# Patient Record
Sex: MALE | Race: WHITE | NOT HISPANIC OR LATINO | Employment: FULL TIME | URBAN - METROPOLITAN AREA
[De-identification: names, ages, dates, MRNs, and addresses within clinical notes are randomized per-mention and may not be internally consistent; named-entity substitution may affect disease eponyms.]

---
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
## 2021-01-14 ENCOUNTER — NON-PROVIDER VISIT (OUTPATIENT)
Dept: URGENT CARE | Facility: PHYSICIAN GROUP | Age: 21
End: 2021-01-14

## 2021-01-14 DIAGNOSIS — Z02.1 PRE-EMPLOYMENT DRUG SCREENING: ICD-10-CM

## 2021-01-14 LAB
AMP AMPHETAMINE: NORMAL
COC COCAINE: NORMAL
INT CON NEG: NORMAL
INT CON POS: NORMAL
MET METHAMPHETAMINES: NORMAL
OPI OPIATES: NORMAL
PCP PHENCYCLIDINE: NORMAL
POC DRUG COMMENT 753798-OCCUPATIONAL HEALTH: NORMAL
THC: NORMAL

## 2021-01-14 PROCEDURE — 80305 DRUG TEST PRSMV DIR OPT OBS: CPT | Performed by: PHYSICIAN ASSISTANT

## 2021-02-18 ENCOUNTER — OFFICE VISIT (OUTPATIENT)
Dept: URGENT CARE | Facility: PHYSICIAN GROUP | Age: 21
End: 2021-02-18
Payer: MEDICAID

## 2021-02-18 VITALS
WEIGHT: 230 LBS | OXYGEN SATURATION: 97 % | HEART RATE: 88 BPM | TEMPERATURE: 98.6 F | RESPIRATION RATE: 16 BRPM | HEIGHT: 70 IN | BODY MASS INDEX: 32.93 KG/M2 | DIASTOLIC BLOOD PRESSURE: 82 MMHG | SYSTOLIC BLOOD PRESSURE: 118 MMHG

## 2021-02-18 DIAGNOSIS — R19.7 NAUSEA VOMITING AND DIARRHEA: ICD-10-CM

## 2021-02-18 DIAGNOSIS — R11.2 NAUSEA VOMITING AND DIARRHEA: ICD-10-CM

## 2021-02-18 PROCEDURE — 99204 OFFICE O/P NEW MOD 45 MIN: CPT | Performed by: PHYSICIAN ASSISTANT

## 2021-02-18 RX ORDER — ONDANSETRON 4 MG/1
4 TABLET, FILM COATED ORAL EVERY 8 HOURS PRN
Qty: 20 TABLET | Refills: 0 | Status: SHIPPED | OUTPATIENT
Start: 2021-02-18 | End: 2021-05-18

## 2021-02-18 ASSESSMENT — FIBROSIS 4 INDEX: FIB4 SCORE: 0.27

## 2021-02-18 NOTE — PROGRESS NOTES
Chief Complaint   Patient presents with   • Emesis       HISTORY OF PRESENT ILLNESS: Patient is a 20 y.o. male who presents today because he has not been feeling good over the last 2 or 3 days, just describes as not feeling good.  However last night he started to have emesis that went into this morning.  He is also had some mild diarrhea.  Denies any fevers, chills.  He has not been taking any medications for his current symptoms    Patient Active Problem List    Diagnosis Date Noted   • Discharge planning issues 07/27/2019   • Intracranial hemorrhage following injury ([facility]) 07/20/2019   • Acute respiratory insufficiency 07/23/2019   • No contraindication to deep vein thrombosis (DVT) prophylaxis 07/21/2019   • Acute blood loss anemia 07/21/2019   • Pelvic fracture ([facility]) 07/20/2019   • Trauma 07/20/2019   • Femoral fracture ([facility]) 07/20/2019   • Lumbar transverse process fracture ([facility]) 07/20/2019   • Closed fracture of right fibula 07/20/2019   • Laceration of right foot 07/20/2019   • Scalp laceration 07/20/2019   • Cigar smoker 10/11/2019       Allergies:Penicillins    Current Outpatient Medications Ordered in Epic   Medication Sig Dispense Refill   • ondansetron (ZOFRAN) 4 MG Tab tablet Take 1 tablet by mouth every 8 hours as needed for Nausea/Vomiting. 20 tablet 0   • docusate sodium (COLACE) 100 MG Cap Take 1 Cap by mouth 2 times a day. 60 Cap 0     No current Epic-ordered facility-administered medications on file.       History reviewed. No pertinent past medical history.    Social History     Tobacco Use   • Smoking status: Current Some Day Smoker     Types: Cigars   • Smokeless tobacco: Never Used   • Tobacco comment: Smokes a cigar pt states every now and then.   Substance Use Topics   • Alcohol use: No   • Drug use: No       No family status information on file.   History reviewed. No pertinent family history.    ROS:  Review of Systems   Constitutional: Negative for fever, chills, positive for myalgias and  "malaise/fatigue.   HENT: Negative for ear pain, nosebleeds, congestion, sore throat and neck pain.    Eyes: Negative for blurred vision.   Respiratory: Negative for cough, sputum production, shortness of breath and wheezing.    Cardiovascular: Negative for chest pain, palpitations, orthopnea and leg swelling.   Gastrointestinal: Negative for heartburn, positive for nausea, vomiting and generalized abdominal pain.   Genitourinary: Negative for dysuria, urgency and frequency.     Exam:  /82 (BP Location: Right arm, Patient Position: Sitting, BP Cuff Size: Large adult)   Pulse 88   Temp 37 °C (98.6 °F) (Temporal)   Resp 16   Ht 1.778 m (5' 10\")   Wt 104 kg (230 lb)   SpO2 97%   General:  Well nourished, well developed male in NAD  Head:Normocephalic, atraumatic  Eyes: PERRLA, EOM within normal limits, no conjunctival injection, no scleral icterus, visual fields and acuity grossly intact.  Nose: Symmetrical without tenderness, no discharge.  Mouth: reasonable hygiene, no erythema exudates or tonsillar enlargement.  Neck: no masses, range of motion within normal limits, no tracheal deviation. No obvious thyroid enlargement.  Abdomen: Nondistended, bowel tones in all 4 quadrants, soft, various areas of mild tenderness to palpation, no organomegaly, no rebound or vertebral tenderness, no Boles's or McBurney's point tenderness.    Extremities: no clubbing, cyanosis, or edema.    Please note that this dictation was created using voice recognition software. I have made every reasonable attempt to correct obvious errors, but I expect that there are errors of grammar and possibly content that I did not discover before finalizing the note.    Assessment/Plan:  1. Nausea vomiting and diarrhea  ondansetron (ZOFRAN) 4 MG Tab tablet   Discussed bland diet, small sips of water.    Followup with primary care in the next 7-10 days if not significantly improving, return to the urgent care or go to the emergency room sooner " for any worsening of symptoms.

## 2021-02-18 NOTE — LETTER
February 18, 2021         Patient: Williams Armstrong   YOB: 2000   Date of Visit: 2/18/2021           To Whom it May Concern:    Williams Armstrong was seen in my clinic on 2/18/2021. He may return to work on 2/20/2021, please excuse any recent absences..    If you have any questions or concerns, please don't hesitate to call.        Sincerely,           Jaswant Schwab P.A.-C.  Electronically Signed

## 2021-05-18 ENCOUNTER — HOSPITAL ENCOUNTER (EMERGENCY)
Facility: MEDICAL CENTER | Age: 21
End: 2021-05-18
Attending: EMERGENCY MEDICINE
Payer: MEDICAID

## 2021-05-18 ENCOUNTER — APPOINTMENT (OUTPATIENT)
Dept: RADIOLOGY | Facility: MEDICAL CENTER | Age: 21
End: 2021-05-18
Attending: EMERGENCY MEDICINE
Payer: MEDICAID

## 2021-05-18 VITALS
BODY MASS INDEX: 31.92 KG/M2 | SYSTOLIC BLOOD PRESSURE: 128 MMHG | RESPIRATION RATE: 16 BRPM | TEMPERATURE: 98 F | OXYGEN SATURATION: 98 % | WEIGHT: 222.44 LBS | HEART RATE: 85 BPM | DIASTOLIC BLOOD PRESSURE: 78 MMHG

## 2021-05-18 DIAGNOSIS — S67.21XA CRUSHING INJURY OF RIGHT HAND, INITIAL ENCOUNTER: ICD-10-CM

## 2021-05-18 DIAGNOSIS — S69.91XA INJURY OF RIGHT HAND, INITIAL ENCOUNTER: ICD-10-CM

## 2021-05-18 PROCEDURE — 700102 HCHG RX REV CODE 250 W/ 637 OVERRIDE(OP): Performed by: EMERGENCY MEDICINE

## 2021-05-18 PROCEDURE — A9270 NON-COVERED ITEM OR SERVICE: HCPCS | Performed by: EMERGENCY MEDICINE

## 2021-05-18 PROCEDURE — 73130 X-RAY EXAM OF HAND: CPT | Mod: RT

## 2021-05-18 PROCEDURE — 99283 EMERGENCY DEPT VISIT LOW MDM: CPT

## 2021-05-18 RX ORDER — IBUPROFEN 600 MG/1
600 TABLET ORAL ONCE
Status: COMPLETED | OUTPATIENT
Start: 2021-05-18 | End: 2021-05-18

## 2021-05-18 RX ORDER — ACETAMINOPHEN 325 MG/1
650 TABLET ORAL ONCE
Status: COMPLETED | OUTPATIENT
Start: 2021-05-18 | End: 2021-05-18

## 2021-05-18 RX ADMIN — ACETAMINOPHEN 650 MG: 325 TABLET, FILM COATED ORAL at 14:27

## 2021-05-18 RX ADMIN — IBUPROFEN 600 MG: 600 TABLET, FILM COATED ORAL at 14:27

## 2021-05-18 ASSESSMENT — FIBROSIS 4 INDEX: FIB4 SCORE: 0.27

## 2021-05-18 ASSESSMENT — PAIN DESCRIPTION - PAIN TYPE: TYPE: ACUTE PAIN

## 2021-05-18 NOTE — ED NOTES
Received orders for DC. Educated regarding f/u with PCP and Dr walker as needed. All questions addressed. VSS. Ambulatory to lobby with steady gait.

## 2021-05-18 NOTE — ED TRIAGE NOTES
Pt comes in complaining of R hand pain since Sunday. Pt stating a transmission fell on his hand when he was working on a truck. Pt with swelling noted

## 2021-05-18 NOTE — ED PROVIDER NOTES
ED Provider Note    This patient was cared for during the COVID-19 pandemic. History and physical exam may be limited/truncated by the inherent challenges of PPE and the need to decrease staff exposure to novel coronavirus. Some aspects of disease management may be different to protect staff and help slow the spread of disease. I verified that, if possible, the patient was wearing a mask and I was wearing appropriate PPE every time I encountered the patient.       CHIEF COMPLAINT  Chief Complaint   Patient presents with   • Hand Pain       HPI  Williams Armstrong is a 20 y.o. male who presents for evaluation of a right hand injury.  Patient says he had an engine fell on his hand on Sunday.  He was seen at [facility] and x-rays were negative however today he was wrenching and he slipped and hit his hand again and has increased pain now to the right hand presents for evaluation.  He has limited range of motion of his finger secondary to the pain.      REVIEW OF SYSTEMS  Positive for right hand pain, negative for other injuries.     PAST MEDICAL HISTORY       SOCIAL HISTORY  Social History     Tobacco Use   • Smoking status: Current Some Day Smoker     Types: Cigars, Cigarettes   • Smokeless tobacco: Never Used   • Tobacco comment: Smokes a cigar pt states every now and then.   Vaping Use   • Vaping Use: Never used   Substance and Sexual Activity   • Alcohol use: Yes   • Drug use: No   • Sexual activity: Not on file       SURGICAL HISTORY   has a past surgical history that includes femur nailing intramedullary (Right, 7/20/2019); irrigation & debridement ortho (Right, 7/20/2019); laceration repair (N/A, 7/20/2019); pelvis orif (Right, 7/24/2019); hardware removal ortho (Right, 10/11/2019); hardware removal ortho (Right, 1/3/2020); and femur orif (Right, 1/3/2020).    CURRENT MEDICATIONS  Home Medications     Reviewed by Stacy Geronimo R.N. (Registered Nurse) on 05/18/21 at 1354  Med List Status: Complete   Medication  Last Dose Status        Patient Jonathan Taking any Medications                       ALLERGIES  Allergies   Allergen Reactions   • Penicillins Rash and Swelling     Rash & Swelling         PHYSICAL EXAM  VITAL SIGNS: /78   Pulse 85   Temp 36.7 °C (98 °F) (Temporal)   Resp 16   Wt 101 kg (222 lb 7.1 oz)   SpO2 98%   BMI 31.92 kg/m²   Constitutional: Alert in no apparent distress. Well apearing  HENT: Normocephalic, Atraumatic, Bilateral external ears normal. Nose normal.   Eyes:  Conjunctiva normal, non-icteric.   Lungs: Non-labored respirations  Skin: Warm, Dry, No erythema, No rash.   Neurologic: Alert, Grossly non-focal.   Psychiatric: Affect normal, Judgment normal, Mood normal, Appears appropriate and not intoxicated.   Extremities: Right hand with swelling over the fourth and fifth metacarpals and limited range of motion at the MCP joints      DIAGNOSTIC STUDIES / PROCEDURES  DX-HAND 3+ RIGHT   Final Result      No acute osseous abnormality.            COURSE & MEDICAL DECISION MAKING  Pertinent Labs & Imaging studies reviewed. (See chart for details)  This is a 20-year-old that presents with a recurrent right hand injury. X-ray is performed and there does not appear to be any fracture. I suspect this is more soft tissue injury. He has good perfusion distally. I did recommend rest ice and elevation and he can follow-up with orthopedics as an outpatient if not improving.     The patient will return for new or worsening symptoms and is stable at the time of discharge. Patient was given return precautions. Patient and/or family member verbalizes understanding and will comply.    DISPOSITION:  Patient will be discharged home in stable condition.    FOLLOW UP:  Delio [facility]oussaint M.D.  [address] [facility] [id number] [phone]      As needed    Davonte Franks M.D.  [address] [place] [address]  [phone]    Schedule an appointment as soon as possible for a visit   As needed ortho follow  [facility] [place], Emergency Dept  [address] [facility] [address]  [phone]    Return for worsening hand swelling pain or other concerns        OUTPATIENT MEDICATIONS:  There are no discharge medications for this patient.        FINAL IMPRESSION  1. Injury of right hand, initial encounter     2. Crushing injury of right hand, initial encounter              This dictation has been creating using voice recognition software. The accuracy of the dictation is limited the abilities of the software.  I expect there may be some errors of grammar and possibly content. I made every attempt to manually correct the errors within my dictation. However errors related to this voice recognition software may still exist and should be interpreted within the appropriate context.        The note accurately reflects work and decisions made by me.  Ally Yoon M.D.  5/18/2021  4:08 PM

## 2021-08-26 ENCOUNTER — OFFICE VISIT (OUTPATIENT)
Dept: URGENT CARE | Facility: CLINIC | Age: 21
End: 2021-08-26

## 2021-08-26 VITALS
HEIGHT: 68 IN | TEMPERATURE: 97.8 F | OXYGEN SATURATION: 97 % | BODY MASS INDEX: 36.22 KG/M2 | DIASTOLIC BLOOD PRESSURE: 80 MMHG | HEART RATE: 74 BPM | SYSTOLIC BLOOD PRESSURE: 116 MMHG | WEIGHT: 239 LBS | RESPIRATION RATE: 14 BRPM

## 2021-08-26 DIAGNOSIS — Z02.1 PRE-EMPLOYMENT DRUG SCREENING: ICD-10-CM

## 2021-08-26 DIAGNOSIS — Z02.1 PHYSICAL EXAM, PRE-EMPLOYMENT: ICD-10-CM

## 2021-08-26 PROCEDURE — 80305 DRUG TEST PRSMV DIR OPT OBS: CPT | Performed by: PHYSICIAN ASSISTANT

## 2021-08-26 PROCEDURE — 8915 PR COMPREHENSIVE PHYSICAL: Performed by: PHYSICIAN ASSISTANT

## 2021-08-26 ASSESSMENT — ENCOUNTER SYMPTOMS
SHORTNESS OF BREATH: 0
FEVER: 0
DIARRHEA: 0
CHILLS: 0
VOMITING: 0
HEADACHES: 0
SORE THROAT: 0
MUSCULOSKELETAL NEGATIVE: 1
DIZZINESS: 0
NAUSEA: 0
PSYCHIATRIC NEGATIVE: 1

## 2021-08-26 ASSESSMENT — FIBROSIS 4 INDEX: FIB4 SCORE: 0.28

## 2021-08-26 NOTE — PROGRESS NOTES
"Subjective     Williams Armstrong is a 21 y.o. male who presents with Employment Physical    Pt PMH, SocHx, SurgHx, FamHx, Drug allergies and medications reviewed with pt/EPIC.      Family history reviewed, it is not pertinent to this complaint.           Patient presents with:  Employment Physical          Review of Systems   Constitutional: Negative for chills and fever.   HENT: Negative for congestion and sore throat.    Respiratory: Negative for shortness of breath.    Cardiovascular: Negative for chest pain.   Gastrointestinal: Negative for diarrhea, nausea and vomiting.   Genitourinary: Negative.    Musculoskeletal: Negative.    Skin: Negative for rash.   Neurological: Negative for dizziness and headaches.   Psychiatric/Behavioral: Negative.    All other systems reviewed and are negative.         See scanned documents for exam results.      Objective     /80   Pulse 74   Temp 36.6 °C (97.8 °F) (Temporal)   Resp 14   Ht 1.727 m (5' 8\")   Wt 108 kg (239 lb)   SpO2 97%   BMI 36.34 kg/m²      Physical Exam          See scanned documents for exam results.           Assessment & Plan        1. Physical exam, pre-employment     Pt is cleared without restrictions.    Patient was evaluated in clinic today while wearing appropriate personal protective equipment.                  "

## 2022-01-04 ENCOUNTER — NON-PROVIDER VISIT (OUTPATIENT)
Dept: URGENT CARE | Facility: PHYSICIAN GROUP | Age: 22
End: 2022-01-04
Payer: MEDICAID

## 2022-01-04 DIAGNOSIS — Z02.1 PRE-EMPLOYMENT DRUG SCREENING: ICD-10-CM

## 2022-01-04 PROCEDURE — 80305 DRUG TEST PRSMV DIR OPT OBS: CPT | Performed by: FAMILY MEDICINE

## 2022-01-20 ENCOUNTER — HOSPITAL ENCOUNTER (OUTPATIENT)
Dept: RADIOLOGY | Facility: MEDICAL CENTER | Age: 22
End: 2022-01-20
Attending: PHYSICAL MEDICINE & REHABILITATION
Payer: MEDICAID

## 2022-01-20 DIAGNOSIS — M54.59 MECHANICAL LOW BACK PAIN: ICD-10-CM

## 2022-01-20 PROCEDURE — 72110 X-RAY EXAM L-2 SPINE 4/>VWS: CPT

## 2022-03-08 ENCOUNTER — NON-PROVIDER VISIT (OUTPATIENT)
Dept: URGENT CARE | Facility: PHYSICIAN GROUP | Age: 22
End: 2022-03-08

## 2022-03-08 DIAGNOSIS — Z02.1 PRE-EMPLOYMENT DRUG SCREENING: ICD-10-CM

## 2022-03-08 PROCEDURE — 8907 PR URINE COLLECT ONLY: Performed by: NURSE PRACTITIONER

## 2022-08-22 ENCOUNTER — NON-PROVIDER VISIT (OUTPATIENT)
Dept: URGENT CARE | Facility: PHYSICIAN GROUP | Age: 22
End: 2022-08-22

## 2022-08-22 DIAGNOSIS — Z02.1 PRE-EMPLOYMENT DRUG SCREENING: ICD-10-CM

## 2022-08-22 LAB
AMP AMPHETAMINE: NORMAL
COC COCAINE: NORMAL
INT CON NEG: NEGATIVE
INT CON POS: POSITIVE
MET METHAMPHETAMINES: NORMAL
OPI OPIATES: NORMAL
PCP PHENCYCLIDINE: NORMAL
POC DRUG COMMENT 753798-OCCUPATIONAL HEALTH: NEGATIVE
THC: NORMAL

## 2022-08-22 PROCEDURE — 80305 DRUG TEST PRSMV DIR OPT OBS: CPT | Performed by: PHYSICIAN ASSISTANT

## 2022-12-28 ENCOUNTER — APPOINTMENT (OUTPATIENT)
Dept: RADIOLOGY | Facility: MEDICAL CENTER | Age: 22
End: 2022-12-28
Attending: EMERGENCY MEDICINE
Payer: MEDICAID

## 2022-12-28 ENCOUNTER — HOSPITAL ENCOUNTER (EMERGENCY)
Facility: MEDICAL CENTER | Age: 22
End: 2022-12-28
Attending: EMERGENCY MEDICINE
Payer: MEDICAID

## 2022-12-28 VITALS
WEIGHT: 239 LBS | DIASTOLIC BLOOD PRESSURE: 73 MMHG | OXYGEN SATURATION: 95 % | HEART RATE: 104 BPM | TEMPERATURE: 98.4 F | SYSTOLIC BLOOD PRESSURE: 124 MMHG | HEIGHT: 68 IN | BODY MASS INDEX: 36.22 KG/M2 | RESPIRATION RATE: 18 BRPM

## 2022-12-28 DIAGNOSIS — E86.0 DEHYDRATION: ICD-10-CM

## 2022-12-28 DIAGNOSIS — R55 SYNCOPE, UNSPECIFIED SYNCOPE TYPE: Primary | ICD-10-CM

## 2022-12-28 LAB
ALBUMIN SERPL BCP-MCNC: 4.8 G/DL (ref 3.2–4.9)
ALBUMIN/GLOB SERPL: 1.5 G/DL
ALP SERPL-CCNC: 68 U/L (ref 30–99)
ALT SERPL-CCNC: 125 U/L (ref 2–50)
AMPHET UR QL SCN: NEGATIVE
ANION GAP SERPL CALC-SCNC: 16 MMOL/L (ref 7–16)
APPEARANCE UR: CLEAR
AST SERPL-CCNC: 37 U/L (ref 12–45)
BARBITURATES UR QL SCN: NEGATIVE
BASOPHILS # BLD AUTO: 0.5 % (ref 0–1.8)
BASOPHILS # BLD: 0.06 K/UL (ref 0–0.12)
BENZODIAZ UR QL SCN: NEGATIVE
BILIRUB SERPL-MCNC: 0.2 MG/DL (ref 0.1–1.5)
BILIRUB UR QL STRIP.AUTO: NEGATIVE
BUN SERPL-MCNC: 11 MG/DL (ref 8–22)
BZE UR QL SCN: NEGATIVE
CALCIUM ALBUM COR SERPL-MCNC: 8.9 MG/DL (ref 8.5–10.5)
CALCIUM SERPL-MCNC: 9.5 MG/DL (ref 8.5–10.5)
CANNABINOIDS UR QL SCN: POSITIVE
CHLORIDE SERPL-SCNC: 106 MMOL/L (ref 96–112)
CO2 SERPL-SCNC: 22 MMOL/L (ref 20–33)
COLOR UR: YELLOW
CREAT SERPL-MCNC: 0.77 MG/DL (ref 0.5–1.4)
EKG IMPRESSION: NORMAL
EOSINOPHIL # BLD AUTO: 0.02 K/UL (ref 0–0.51)
EOSINOPHIL NFR BLD: 0.2 % (ref 0–6.9)
ERYTHROCYTE [DISTWIDTH] IN BLOOD BY AUTOMATED COUNT: 40 FL (ref 35.9–50)
ETHANOL BLD-MCNC: 74.3 MG/DL
GFR SERPLBLD CREATININE-BSD FMLA CKD-EPI: 130 ML/MIN/1.73 M 2
GLOBULIN SER CALC-MCNC: 3.1 G/DL (ref 1.9–3.5)
GLUCOSE SERPL-MCNC: 99 MG/DL (ref 65–99)
GLUCOSE UR STRIP.AUTO-MCNC: NEGATIVE MG/DL
HCT VFR BLD AUTO: 47.9 % (ref 42–52)
HGB BLD-MCNC: 16.5 G/DL (ref 14–18)
IMM GRANULOCYTES # BLD AUTO: 0.1 K/UL (ref 0–0.11)
IMM GRANULOCYTES NFR BLD AUTO: 0.8 % (ref 0–0.9)
KETONES UR STRIP.AUTO-MCNC: NEGATIVE MG/DL
LACTATE SERPL-SCNC: 2.5 MMOL/L (ref 0.5–2)
LEUKOCYTE ESTERASE UR QL STRIP.AUTO: NEGATIVE
LIPASE SERPL-CCNC: 41 U/L (ref 11–82)
LYMPHOCYTES # BLD AUTO: 2.13 K/UL (ref 1–4.8)
LYMPHOCYTES NFR BLD: 18.1 % (ref 22–41)
MCH RBC QN AUTO: 29.8 PG (ref 27–33)
MCHC RBC AUTO-ENTMCNC: 34.4 G/DL (ref 33.7–35.3)
MCV RBC AUTO: 86.5 FL (ref 81.4–97.8)
METHADONE UR QL SCN: NEGATIVE
MICRO URNS: NORMAL
MONOCYTES # BLD AUTO: 0.93 K/UL (ref 0–0.85)
MONOCYTES NFR BLD AUTO: 7.9 % (ref 0–13.4)
NEUTROPHILS # BLD AUTO: 8.54 K/UL (ref 1.82–7.42)
NEUTROPHILS NFR BLD: 72.5 % (ref 44–72)
NITRITE UR QL STRIP.AUTO: NEGATIVE
NRBC # BLD AUTO: 0 K/UL
NRBC BLD-RTO: 0 /100 WBC
NT-PROBNP SERPL IA-MCNC: <5 PG/ML (ref 0–125)
OPIATES UR QL SCN: NEGATIVE
OXYCODONE UR QL SCN: NEGATIVE
PCP UR QL SCN: NEGATIVE
PH UR STRIP.AUTO: 6 [PH] (ref 5–8)
PLATELET # BLD AUTO: 375 K/UL (ref 164–446)
PMV BLD AUTO: 9.6 FL (ref 9–12.9)
POTASSIUM SERPL-SCNC: 3.4 MMOL/L (ref 3.6–5.5)
PROPOXYPH UR QL SCN: NEGATIVE
PROT SERPL-MCNC: 7.9 G/DL (ref 6–8.2)
PROT UR QL STRIP: NEGATIVE MG/DL
RBC # BLD AUTO: 5.54 M/UL (ref 4.7–6.1)
RBC UR QL AUTO: NEGATIVE
SODIUM SERPL-SCNC: 144 MMOL/L (ref 135–145)
SP GR UR STRIP.AUTO: 1.01
TROPONIN T SERPL-MCNC: <6 NG/L (ref 6–19)
UROBILINOGEN UR STRIP.AUTO-MCNC: 0.2 MG/DL
WBC # BLD AUTO: 11.8 K/UL (ref 4.8–10.8)

## 2022-12-28 PROCEDURE — 83690 ASSAY OF LIPASE: CPT

## 2022-12-28 PROCEDURE — 87150 DNA/RNA AMPLIFIED PROBE: CPT

## 2022-12-28 PROCEDURE — 80307 DRUG TEST PRSMV CHEM ANLYZR: CPT

## 2022-12-28 PROCEDURE — 700105 HCHG RX REV CODE 258: Performed by: EMERGENCY MEDICINE

## 2022-12-28 PROCEDURE — 83605 ASSAY OF LACTIC ACID: CPT

## 2022-12-28 PROCEDURE — 80053 COMPREHEN METABOLIC PANEL: CPT

## 2022-12-28 PROCEDURE — 84484 ASSAY OF TROPONIN QUANT: CPT

## 2022-12-28 PROCEDURE — 71045 X-RAY EXAM CHEST 1 VIEW: CPT

## 2022-12-28 PROCEDURE — 83880 ASSAY OF NATRIURETIC PEPTIDE: CPT

## 2022-12-28 PROCEDURE — 94760 N-INVAS EAR/PLS OXIMETRY 1: CPT

## 2022-12-28 PROCEDURE — 700117 HCHG RX CONTRAST REV CODE 255: Performed by: EMERGENCY MEDICINE

## 2022-12-28 PROCEDURE — 93005 ELECTROCARDIOGRAM TRACING: CPT

## 2022-12-28 PROCEDURE — 36415 COLL VENOUS BLD VENIPUNCTURE: CPT

## 2022-12-28 PROCEDURE — 85025 COMPLETE CBC W/AUTO DIFF WBC: CPT

## 2022-12-28 PROCEDURE — 81003 URINALYSIS AUTO W/O SCOPE: CPT | Mod: XU

## 2022-12-28 PROCEDURE — 99285 EMERGENCY DEPT VISIT HI MDM: CPT

## 2022-12-28 PROCEDURE — 70450 CT HEAD/BRAIN W/O DYE: CPT

## 2022-12-28 PROCEDURE — 71260 CT THORAX DX C+: CPT

## 2022-12-28 PROCEDURE — 87077 CULTURE AEROBIC IDENTIFY: CPT

## 2022-12-28 PROCEDURE — 82077 ASSAY SPEC XCP UR&BREATH IA: CPT

## 2022-12-28 PROCEDURE — 87040 BLOOD CULTURE FOR BACTERIA: CPT

## 2022-12-28 PROCEDURE — 93005 ELECTROCARDIOGRAM TRACING: CPT | Performed by: EMERGENCY MEDICINE

## 2022-12-28 RX ORDER — SODIUM CHLORIDE 9 MG/ML
1000 INJECTION, SOLUTION INTRAVENOUS ONCE
Status: COMPLETED | OUTPATIENT
Start: 2022-12-28 | End: 2022-12-28

## 2022-12-28 RX ADMIN — SODIUM CHLORIDE 1000 ML: 9 INJECTION, SOLUTION INTRAVENOUS at 02:27

## 2022-12-28 RX ADMIN — IOHEXOL 100 ML: 350 INJECTION, SOLUTION INTRAVENOUS at 02:45

## 2022-12-28 ASSESSMENT — LIFESTYLE VARIABLES
HAVE PEOPLE ANNOYED YOU BY CRITICIZING YOUR DRINKING: NO
DO YOU DRINK ALCOHOL: YES
TOTAL SCORE: 0
EVER FELT BAD OR GUILTY ABOUT YOUR DRINKING: NO
TOTAL SCORE: 0
HAVE YOU EVER FELT YOU SHOULD CUT DOWN ON YOUR DRINKING: NO
EVER HAD A DRINK FIRST THING IN THE MORNING TO STEADY YOUR NERVES TO GET RID OF A HANGOVER: NO
CONSUMPTION TOTAL: INCOMPLETE
TOTAL SCORE: 0
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
## 2022-12-28 NOTE — DISCHARGE INSTRUCTIONS
I am not sure what caused her to pass out tonight.  However your full work-up here was very reassuring.  As I discussed with you, if you change your mind and would like to stay in the hospital we are happy to continue to evaluate you.  If you have any worsening symptoms, please return to the ED.  Thank you for coming in today.
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
## 2022-12-28 NOTE — ED NOTES
Ambulation trial complete. Pt walked 2.5 laps without dizziness or drop in SpO2. Spo2 96-97% during ambulation

## 2022-12-30 NOTE — ED NOTES
"ED Positive Culture Follow-up/Notification Note:    Date: 12/30/2022     Patient seen in the ED on 12/28/2022 for syncopal episode. History of substance abuse, recent 5 day hospitalization for overdose, recent UTI and pneumonia diagnoses and courses of antibiotics, per patient. Afebrile, with mild leukocytosis. Suspicion of dehydration and pneumonitis. Blood cultures taken and patient is discharged without antibiotics.  1. Syncope, unspecified syncope type Acute   2. Dehydration Acute      There are no discharge medications for this patient.      Allergies: Penicillins     Vitals:    12/28/22 0133 12/28/22 0200 12/28/22 0300 12/28/22 0500   BP: 131/74 130/73 122/64 124/73   Pulse: (!) 107 (!) 101 (!) 101 (!) 104   Resp: 17 16  18   Temp: 36.7 °C (98.1 °F)   36.9 °C (98.4 °F)   TempSrc: Temporal      SpO2: 96% 93% 94% 95%   Weight:       Height:           Final cultures:   Results       Procedure Component Value Units Date/Time    BLOOD CULTURE [[id number]]  (Abnormal) Collected: 12/28/22 0222    Order Status: Completed Specimen: Blood from Peripheral Updated: 12/30/22 0120     Significant Indicator POS     Source BLD     Site PERIPHERAL     Culture Result Growth detected by Bactec instrument. 12/30/2022  01:19  Gram Stain: Gram positive cocci: Possible Staphylococcus sp.  Negative for Staphylococcus aureus and MRSA by PCR. Correlate  ongoing need for antibiotics with clinical condition.  Further report to follow.      Narrative:      Per Hospital Policy: Only change Specimen Src: to \"Line\" if  specified by physician order.  No site indicated    BLOOD CULTURE [[id number]] Collected: 12/28/22 0337    Order Status: Completed Specimen: Blood from Peripheral Updated: 12/29/22 0645     Significant Indicator NEG     Source BLD     Site PERIPHERAL     Culture Result No Growth  Note: Blood cultures are incubated for 5 days and  are monitored continuously.Positive blood cultures  are called to the RN and reported as soon " "as  they are identified.      Narrative:      Per Hospital Policy: Only change Specimen Src: to \"Line\" if  specified by physician order.  Left AC    URINALYSIS CULTURE, IF INDICATED [[id number]] Collected: 12/28/22 0135    Order Status: Completed Specimen: Urine, Clean Catch Updated: 12/28/22 0313     Color Yellow     Character Clear     Specific Gravity 1.006     Ph 6.0     Glucose Negative mg/dL      Ketones Negative mg/dL      Protein Negative mg/dL      Bilirubin Negative     Urobilinogen, Urine 0.2     Nitrite Negative     Leukocyte Esterase Negative     Occult Blood Negative     Micro Urine Req see below     Comment: Microscopic examination not performed when specimen is clear  and chemically negative for protein, blood, leukocyte esterase  and nitrite.         Narrative:      Indication for culture:->Patient WITHOUT an indwelling Tom  catheter in place with new onset of Dysuria, Frequency,  Urgency, and/or Suprapubic pain            Plan:   Blood culture is growing Staphylococcus species, not aureus, in 1/2 sets. This is likely coagulase-negative Staph and likely represents a contaminant. Patient did not appear with complaints of an infectious nature, nor does he have indwelling lines, ports, or catheters. Called patient to discuss result and ask about any signs or symptoms of systemic infection such as fever, vomiting, or diarrhea. Patient states he feels well and has not had fevers, vomiting, or diarrhea. This culture represents a contaminant, and no further changes are required.    Remigio Scott, PharmD   "

## 2023-01-01 LAB
BACTERIA BLD CULT: ABNORMAL
BACTERIA BLD CULT: ABNORMAL
SIGNIFICANT IND 70042: ABNORMAL
SITE SITE: ABNORMAL
SOURCE SOURCE: ABNORMAL

## 2023-01-02 LAB
BACTERIA BLD CULT: NORMAL
SIGNIFICANT IND 70042: NORMAL
SITE SITE: NORMAL
SOURCE SOURCE: NORMAL

## 2023-04-24 ENCOUNTER — APPOINTMENT (OUTPATIENT)
Dept: RADIOLOGY | Facility: IMAGING CENTER | Age: 23
End: 2023-04-24
Attending: FAMILY MEDICINE
Payer: COMMERCIAL

## 2023-04-24 ENCOUNTER — OCCUPATIONAL MEDICINE (OUTPATIENT)
Dept: URGENT CARE | Facility: PHYSICIAN GROUP | Age: 23
End: 2023-04-24
Payer: COMMERCIAL

## 2023-04-24 VITALS
SYSTOLIC BLOOD PRESSURE: 134 MMHG | HEIGHT: 70 IN | BODY MASS INDEX: 35.79 KG/M2 | WEIGHT: 250 LBS | DIASTOLIC BLOOD PRESSURE: 86 MMHG | RESPIRATION RATE: 16 BRPM | TEMPERATURE: 98.1 F | HEART RATE: 101 BPM | OXYGEN SATURATION: 100 %

## 2023-04-24 DIAGNOSIS — S93.401A SPRAIN OF RIGHT ANKLE, UNSPECIFIED LIGAMENT, INITIAL ENCOUNTER: ICD-10-CM

## 2023-04-24 DIAGNOSIS — S93.601A SPRAIN OF RIGHT FOOT, INITIAL ENCOUNTER: ICD-10-CM

## 2023-04-24 PROCEDURE — 73630 X-RAY EXAM OF FOOT: CPT | Mod: TC,FY,RT | Performed by: PHYSICIAN ASSISTANT

## 2023-04-24 PROCEDURE — 73610 X-RAY EXAM OF ANKLE: CPT | Mod: TC,FY,RT | Performed by: PHYSICIAN ASSISTANT

## 2023-04-24 PROCEDURE — 99213 OFFICE O/P EST LOW 20 MIN: CPT | Performed by: FAMILY MEDICINE

## 2023-04-24 RX ORDER — ONDANSETRON 4 MG/1
TABLET, FILM COATED ORAL
COMMUNITY
Start: 2023-03-29 | End: 2023-04-27

## 2023-04-24 RX ORDER — NAPROXEN 500 MG/1
500 TABLET ORAL 2 TIMES DAILY PRN
COMMUNITY
Start: 2023-02-27 | End: 2023-04-27

## 2023-04-24 ASSESSMENT — PAIN SCALES - GENERAL: PAINLEVEL: 9=SEVERE PAIN

## 2023-04-24 ASSESSMENT — FIBROSIS 4 INDEX: FIB4 SCORE: 0.19

## 2023-04-24 NOTE — PROGRESS NOTES
Subjective:   Williams Armstrong is a 22 y.o. male who presents for Work-Related Injury (R ankle injury )    Date of injury 4/24/2023.  22-year-old  presents with chief complaint of right ankle and foot pain, sustained today 4/24/2023 when stepping out of a  and slipped off a step and landed directly onto the right foot.  Reports experiencing a pop in the ankle and sudden pain in the right ankle with limited ability to ambulate.  Reports being unable to complete the shift with limitations from pain in the right ankle.  Reports numbness in the lateral aspect of the foot.  Denies specific loss of strength or paralysis in the foot.   See the C4 and D39 form    PMH:  has no past medical history on file.  MEDS:   Current Outpatient Medications:     naproxen (NAPROSYN) 500 MG Tab, Take 500 mg by mouth 2 times a day as needed. PAIN (Patient not taking: Reported on 4/24/2023), Disp: , Rfl:     ondansetron (ZOFRAN) 4 MG Tab tablet, TAKE 1 TABLET BY MOUTH EVERY 8 HOURS FOR 5 DAYS (Patient not taking: Reported on 4/24/2023), Disp: , Rfl:   ALLERGIES:   Allergies   Allergen Reactions    Penicillins Rash and Swelling     Rash & Swelling       SURGHX:   Past Surgical History:   Procedure Laterality Date    HARDWARE REMOVAL ORTHO Right 1/3/2020    Procedure: HARDWARE REMOVAL ORTHO- PELVIS;  Surgeon: Miguel A Solares M.D.;  Location: [facility];  Service: Orthopedics    ORIF, FRACTURE, FEMUR Right 1/3/2020    Procedure: ORIF, FRACTURE, FEMUR;  Surgeon: Miguel A Solares M.D.;  Location: [facility];  Service: Orthopedics    HARDWARE REMOVAL ORTHO Right 10/11/2019    Procedure: HARDWARE REMOVAL ORTHO - FEMUR SCREW DYNAMIZATION;  Surgeon: Miguel A Solares M.D.;  Location: [facility];  Service: Orthopedics    ORIF, PELVIS Right 7/24/2019    Procedure: ORIF, PELVIS- SIDE TO BE DETERMINED ;  Surgeon: Miguel A Solares M.D.;  Location: [facility];  Service:  "Orthopedics    FEMUR NAILING INTRAMEDULLARY Right 7/20/2019    Procedure: INSERTION, INTRAMEDULLARY KRIS, FEMUR;  Surgeon: Delio Lee M.D.;  Location: SUR[facility]ERY [facility];  Service: Orthopedics    IRRIGATION & DEBRIDEMENT ORTHO Right 7/20/2019    Procedure: IRRIGATION AND DEBRIDEMENT, WOUND;  Surgeon: Delio Lee M.D.;  Location: SURGERY [facility];  Service: Orthopedics    LACERATION REPAIR N/A 7/20/2019    Procedure: REPAIR, LACERATION- SCALP;  Surgeon: Delio Lee M.D.;  Location: SURGERY [facility];  Service: Orthopedics     SOCHX:  reports that he has been smoking cigars and cigarettes. He has never used smokeless tobacco. He reports current alcohol use. He reports that he does not use drugs.  FH: History reviewed. No pertinent family history.  ROS     Objective:   /86   Pulse (!) 101   Temp 36.7 °C (98.1 °F) (Temporal)   Resp 16   Ht 1.778 m (5' 10\")   Wt 113 kg (250 lb)   SpO2 100%   BMI 35.87 kg/m²   Physical Exam  Vitals and nursing note reviewed.   Constitutional:       General: He is not in acute distress.     Appearance: He is well-developed.   HENT:      Head: Normocephalic and atraumatic.      Right Ear: External ear normal.      Left Ear: External ear normal.      Nose: Nose normal.      Mouth/Throat:      Mouth: Mucous membranes are moist.   Eyes:      Conjunctiva/sclera: Conjunctivae normal.   Cardiovascular:      Rate and Rhythm: Normal rate.   Pulmonary:      Effort: Pulmonary effort is normal. No respiratory distress.      Breath sounds: Normal breath sounds.   Abdominal:      General: There is no distension.   Skin:     General: Skin is warm and dry.   Neurological:      General: No focal deficit present.      Mental Status: He is alert and oriented to person, place, and time. Mental status is at baseline.      Gait: Gait abnormal (Antalgic right).   Psychiatric:         Judgment: Judgment normal.     Left ankle: Diffuse lateral ankle edema, no ecchymosis, " bony point tenderness lateral malleolus and diffusely throughout the ankle, range of motion intact flexion extension internal/external rotation yet with guarding noted throughout, neurovascular intact throughout, sensation intact to light touch throughout  Left foot: Diffuse lateral edema, no bony point tenderness, neurovascular intact throughout   Assessment/Plan:   1. Sprain of right ankle, unspecified ligament, initial encounter  - DX-ANKLE 3+ VIEWS RIGHT; Future  - DX-FOOT-COMPLETE 3+ RIGHT; Future  - Walking Boot  - CRUTCHES    2. Sprain of right foot, initial encounter  - DX-ANKLE 3+ VIEWS RIGHT; Future  - DX-FOOT-COMPLETE 3+ RIGHT; Future  - Walking Boot  - CRUTCHES    Other orders  - naproxen (NAPROSYN) 500 MG Tab; Take 500 mg by mouth 2 times a day as needed. PAIN (Patient not taking: Reported on 4/24/2023)  - ondansetron (ZOFRAN) 4 MG Tab tablet; TAKE 1 TABLET BY MOUTH EVERY 8 HOURS FOR 5 DAYS (Patient not taking: Reported on 4/24/2023)    See the C4 and D39 form.  Recommend walking boot, crutches, ice application as needed, acetaminophen as needed.  Recommend return to clinic in 3 days and anticipate advancement of work activity.    Medical Decision Making/Course:  In the course of preparing for this visit with review of the pertinent past medical history, recent and past clinic visits, current medications, and performing chart, immunization, medical history and medication reconciliation, and in the further course of obtaining the current history pertinent to the clinic visit today, performing an exam and evaluation, ordering and independently evaluating labs, tests including independent interpretation and evaluation of x-ray imaging, and/or procedures including application of walking boot and crutches during urgent care visit, prescribing any recommended new medications as noted above, providing any pertinent counseling and education and recommending further coordination of care and including  recommendation for work activity, at least  48 minutes of total time were spent during this encounter.        Please note that this dictation was created using voice recognition software. I have worked with consultants from the vendor as well as technical experts from [facility] to optimize the interface. I have made every reasonable attempt to correct obvious errors, but I expect that there are errors of grammar and possibly content that I did not discover before finalizing the note.

## 2023-04-24 NOTE — LETTER
[address] [place] [address]  Phone:  [phone] - Fax:  [phone]   [facility] Progress Report and Disability Certification  Date of Service: 4/24/2023   No Show:  No  Date / Time of Next Visit: 4/27/2023   Claim Information   Patient Name: Williams Armstrong  Claim Number:     Employer:    Date of Injury: 4/24/2023     Insurer / TPA: Mavis Gracia  ID / SSN:     Occupation:   Diagnosis: Diagnoses of Sprain of right ankle, unspecified ligament, initial encounter and Sprain of right foot, initial encounter were pertinent to this visit.    Medical Information   Related to Industrial Injury? Yes    Subjective Complaints:  Date of injury 4/24/2023.  22-year-old  presents with chief complaint of right ankle and foot pain, sustained today 4/24/2023 when stepping out of a  and slipped off a step and landed directly onto the right foot.  Reports experiencing a pop in the ankle and sudden pain in the right ankle with limited ability to ambulate.  Reports being unable to complete the shift with limitations from pain in the right ankle.  Reports numbness in the lateral aspect of the foot.  Denies specific loss of strength or paralysis in the foot.   Objective Findings: Left ankle: Diffuse lateral ankle edema, no ecchymosis, bony point tenderness lateral malleolus and diffusely throughout the ankle, range of motion intact flexion extension internal/external rotation yet with guarding noted throughout, neurovascular intact throughout, sensation intact to light touch throughout  Left foot: Diffuse lateral edema, no bony point tenderness, neurovascular intact throughout   Pre-Existing Condition(s):     Assessment:   Initial Visit    Status: Additional Care Required  Permanent Disability:No    Plan:   Comments:Boot cast, over-the-counter acetaminophen as needed, crutches, ice application as needed    Diagnostics:  X-ray  Comments:X-ray right ankle and right foot: No acute fracture    Comments:       Disability Information   Status: Released to Restricted Duty    From:  4/24/2023  Through: 4/27/2023 Restrictions are: Temporary   Physical Restrictions   Sitting:    Standing:    Stooping:    Bending:      Squatting:    Walking:    Climbing:    Pushing:      Pulling:    Other:    Reaching Above Shoulder (L):   Reaching Above Shoulder (R):       Reaching Below Shoulder (L):    Reaching Below Shoulder (R):      Not to exceed Weight Limits   Carrying(hrs):   Weight Limit(lb):   Lifting(hrs):   Weight  Limit(lb):     Comments: Nonweightbearing, seated work only, recommend boot cast and crutches    Repetitive Actions   Hands: i.e. Fine Manipulations from Grasping:     Feet: i.e. Operating Foot Controls:     Driving / Operate Machinery:     Health Care Provider’s Original or Electronic Signature  Jose Urena M.D. Health Care Provider’s Original or Electronic Signature    Alejo Johnson DO MPH     Clinic Name / Location: [address] [address] Clinic Phone Number: Dept: [phone]   Appointment Time: 11:45 Am Visit Start Time: 12:47 PM   Check-In Time:  12:11 Pm Visit Discharge Time:  1:53pm   Original-Treating Physician or Chiropractor    Page 2-Insurer/TPA    Page 3-Employer    Page 4-Employee

## 2023-04-24 NOTE — LETTER
"EMPLOYEE’S CLAIM FOR COMPENSATION/ REPORT OF INITIAL TREATMENT  FORM C-4  PLEASE TYPE OR PRINT    EMPLOYEE’S CLAIM - PROVIDE ALL INFORMATION REQUESTED   First Name  Williams Last Name  Nathaniel Birthdate                    2000                Sex  male Claim Number (Insurer’s Use Only)   Home Address  [address] Age  22 y.o. Height  1.778 m (5' 10\") Weight  113 kg (250 lb) [facility]     [facility] Zip  [address] Telephone  [phone] (home)    Mailing Address  [address] [facility] Zip  [address] Primary Language Spoken  English    INSURER   THIRD-PARTY     Mavis Gracia   Employee's Occupation (Job Title) When Injury or Occupational Disease Occurred      Employer's Name/Company Name     Telephone      Office Mail Address (Number and Street)          Date of Injury  4/24/2023               Hours Injury  11:30 AM Date Employer Notified  4/24/2023 Last Day of Work after Injury or Occupational Disease  4/24/2023 Supervisor to Whom Injury     Reported  RAZA   Address or Location of Accident (if applicable)  Work [1]   What were you doing at the time of accident? (if applicable)  GETTING OUT OF     How did this injury or occupational disease occur? (Be specific and answer in detail. Use additional sheet if necessary)  WAS GETTING OUT OF MY  SLIPPED OFF STEP AND LANDED ON ANKLE.   If you believe that you have an occupational disease, when did you first have knowledge of the disability and its relationship to your employment?  N/A Witnesses to the Accident (if applicable)  NO ONE      Nature of Injury or Occupational Disease  Strain  Part(s) of Body Injured or Affected  Ankle (R), N/A, N/A    I CERTIFY THAT THE ABOVE IS TRUE AND CORRECT TO T HE BEST OF MY KNOWLEDGE AND THAT I HAVE PROVIDED THIS INFORMATION IN ORDER TO OBTAIN THE BENEFITS OF [place] AND " [place], [place]).  I HEREBY AUTHORIZE ANY PHYSICIAN, CHIROPRACTOR, SURGEON, PRACTITIONER OR ANY OTHER PERSON, ANY HOSPITAL, INCLUDING [facility] OR [facility], ANY  MEDICAL SERVICE ORGANIZATION, ANY INSURANCE COMPANY, OR OTHER INSTITUTION OR ORGANIZATION TO RELEASE TO EACH OTHER, ANY MEDICAL OR OTHER INFORMATION, INCLUDING BENEFITS PAID OR PAYABLE, PERTINENT TO THIS INJURY OR DISEASE, EXCEPT INFORMATION RELATIVE TO DIAGNOSIS, TREATMENT AND/OR COUNSELING FOR AIDS, PSYCHOLOGICAL CONDITIONS, ALCOHOL OR CONTROLLED SUBSTANCES, FOR WHICH I MUST GIVE SPECIFIC AUTHORIZATION.  A PHOTOSTAT OF THIS AUTHORIZATION SHALL BE VALID AS THE ORIGINAL.       Date   Place Employee’s Original or  *Electronic Signature   THIS REPORT MUST BE COMPLETED AND MAILED WITHIN 3 WORKING DAYS OF TREATMENT   Place  [facility]  Name of Facility  [place]   Date  4/24/2023 Diagnosis and Description of Injury or Occupational Disease  (S93.401A) Sprain of right ankle, unspecified ligament, initial encounter  (S93.601A) Sprain of right foot, initial encounter Is there evidence that the injured employee was under the influence of alcohol and/or another controlled substance at the time of accident?  ? No ? Yes (if yes, please explain)   Hour  12:47 PM   Diagnoses of Sprain of right ankle, unspecified ligament, initial encounter and Sprain of right foot, initial encounter were pertinent to this visit. No   Treatment  Walking boot, crutches, ice  Have you advised the patient to remain off work five days or     more?    X-Ray Findings  Negative  Comments:X-ray right foot and ankle: No acute fracture   ? Yes Indicate dates:   From   To      From information given by the employee, together with medical evidence, can        you directly connect this injury or occupational disease as job incurred?  Yes ? No If no, is the injured employee capable of:  ? full  "duty  No ? modified duty  Yes   Is additional medical care by a physician indicated?  Yes If modified duty, specify any limitations / restrictions  Seated work only, nonweightbearing   Do you know of any previous injury or disease contributing to this condition or occupational disease?  ? Yes ? No (Explain if yes)                          No   Date  4/24/2023 Print Health Care Provider's  Name  Jose Urena M.D. I certify that the employer’s copy of  this form was delivered to the employer on:   Address  [address] Insurer’s Use Only     [facility] Zip  [address]    Provider’s Tax ID Number  [id number] Telephone  Dept: [phone]             Health Care Provider’s Original or Electronic Signature  e-JOSE Callejas M.D. Degree (MD,DO, DC,PA-C,APRN)  MD      * Complete and attach Release of Information (Form C-4A) when injured employee signs C-4 Form electronically  ORIGINAL - TREATING HEALTHCARE PROVIDER PAGE 2 - INSURER/TPA PAGE 3 - EMPLOYER PAGE 4 - EMPLOYEE             Form C-4 (rev.08/21)           BRIEF DESCRIPTION OF RIGHTS AND BENEFITS  (Pursuant to [place] 616C.050)    Notice of Injury or Occupational Disease (Incident Report Form C-1): If an injury or occupational disease (OD) arises out of and in the course of employment, you must provide written notice to your employer as soon as practicable, but no later than 7 days after the accident or OD. Your employer shall maintain a sufficient supply of the required forms.    Claim for Compensation (Form C-4): If medical treatment is sought, the form C-4 is available at the place of initial treatment. A completed \"Claim for Compensation\" (Form C-4) must be filed within 90 days after an accident or OD. The treating physician or chiropractor must, within 3 working days after treatment, complete and mail to the employer, the employer's insurer and third-party , the Claim for Compensation.    Medical Treatment: If you " require medical treatment for your on-the-job injury or OD, you may be required to select a physician or chiropractor from a list provided by your workers’ compensation insurer, if it has contracted with an Organization for Managed Care (MCO) or Preferred Provider Organization (PPO) or providers of health care. If your employer has not entered into a contract with an MCO or PPO, you may select a physician or chiropractor from the Panel of Physicians and Chiropractors. Any medical costs related to your industrial injury or OD will be paid by your insurer.    Temporary Total Disability (TTD): If your doctor has certified that you are unable to work for a period of at least 5 consecutive days, or 5 cumulative days in a 20-day period, or places restrictions on you that your employer does not accommodate, you may be entitled to TTD compensation.    Temporary Partial Disability (TPD): If the wage you receive upon reemployment is less than the compensation for TTD to which you are entitled, the insurer may be required to pay you TPD compensation to make up the difference. TPD can only be paid for a maximum of 24 months.    Permanent Partial Disability (PPD): When your medical condition is stable and there is an indication of a PPD as a result of your injury or OD, within 30 days, your insurer must arrange for an evaluation by a rating physician or chiropractor to determine the degree of your PPD. The amount of your PPD award depends on the date of injury, the results of the PPD evaluation, your age and wage.    Permanent Total Disability (PTD): If you are medically certified by a treating physician or chiropractor as permanently and totally disabled and have been granted a PTD status by your insurer, you are entitled to receive monthly benefits not to exceed 66 2/3% of your average monthly wage. The amount of your PTD payments is subject to reduction if you previously received a lump-sum PPD award.    Vocational  Rehabilitation Services: You may be eligible for vocational rehabilitation services if you are unable to return to the job due to a permanent physical impairment or permanent restrictions as a result of your injury or occupational disease.    Transportation and Per Freda Reimbursement: You may be eligible for travel expenses and per freda associated with medical treatment.    Reopening: You may be able to reopen your claim if your condition worsens after claim closure.     Appeal Process: If you disagree with a written determination issued by the insurer or the insurer does not respond to your request, you may appeal to the Department of Administration, , by following the instructions contained in your determination letter. You must appeal the determination within 70 days from the date of the determination letter at [address], [address], [place], [place] [address], or [address], [address], [place], [place] [address]. If you disagree with the  decision, you may appeal to the Department of Administration, . You must file your appeal within 30 days from the date of the  decision letter at [address], [address], [place], [place] [address], or [address], [address], [place], [place] [address]. If you disagree with a decision of an , you may file a petition for judicial review with the District Court. You must do so within 30 days of the Appeal Officer’s decision. You may be represented by an  at your own expense or you may contact the [facility] for possible representation.    [place]  for Injured Workers (NAIW): If you disagree with a  decision, you may request that NAIW represent you without charge at an  Hearing. For information regarding denial of benefits, you may contact the [facility] at: [address], [address], [place] [address], [phone], or [address], [address], [place] [address], [phone]    To File a Complaint with the Division: If you wish to file a complaint with the  of the Division of Industrial Relations (DIR),  please contact the Workers’ Compensation Section, [address], [address], [place], [place] [address], telephone ([phone]) [phone], or [address], [address], [place], [place] [address], telephone [phone].    For assistance with Workers’ Compensation Issues: You may contact the [facility] for Consumer Health Assistance, [address], [address], [place], [place] [address], Toll Free 1-490.639.1678, Web site: http://[facility].[place].gov/Programs/CHRISTIAN E-mail: [email or web address]              __________________________________________________________________                                    _________________            Employee Name / Signature                                                                                                                            Date                                                                                                                                                                                                                              D-2 (rev. 10/20)

## 2023-04-27 ENCOUNTER — OCCUPATIONAL MEDICINE (OUTPATIENT)
Dept: URGENT CARE | Facility: PHYSICIAN GROUP | Age: 23
End: 2023-04-27
Payer: COMMERCIAL

## 2023-04-27 VITALS
RESPIRATION RATE: 14 BRPM | BODY MASS INDEX: 35.36 KG/M2 | TEMPERATURE: 98.3 F | SYSTOLIC BLOOD PRESSURE: 118 MMHG | DIASTOLIC BLOOD PRESSURE: 78 MMHG | HEART RATE: 91 BPM | OXYGEN SATURATION: 96 % | WEIGHT: 247 LBS | HEIGHT: 70 IN

## 2023-04-27 DIAGNOSIS — S93.401A SPRAIN OF RIGHT ANKLE, UNSPECIFIED LIGAMENT, INITIAL ENCOUNTER: ICD-10-CM

## 2023-04-27 DIAGNOSIS — S93.601A SPRAIN OF RIGHT FOOT, INITIAL ENCOUNTER: ICD-10-CM

## 2023-04-27 PROCEDURE — 99213 OFFICE O/P EST LOW 20 MIN: CPT | Performed by: NURSE PRACTITIONER

## 2023-04-27 ASSESSMENT — FIBROSIS 4 INDEX: FIB4 SCORE: 0.19

## 2023-04-27 NOTE — LETTER
[facility]  [place] [address]  Phone:  [phone] - Fax:  [phone]   [facility] Progress Report and Disability Certification  Date of Service: 4/27/2023   No Show:  No  Date / Time of Next Visit: 5/4/2023   Claim Information   Patient Name: Williams Armstrong  Claim Number:     Employer:   [employer] Date of Injury: 4/24/2023     Insurer / TPA: Mavis Gracia  ID / SSN:     Occupation:   Diagnosis: There were no encounter diagnoses.    Medical Information   Related to Industrial Injury? Yes    Subjective Complaints:  This patient's second visit  DOI: 4/24/2023 GARETT: Patient reports that he stepped out of a  and slipped off the step and landed directly onto his right ankle causing pain and he heard a popping sound in his ankle.  Patient was seen in [facility] for first Worker's Comp. visit.  X-rays were performed which did not show any fracture.  Patient presents today in a walking boot.  Pain level today is 6/10 with ambulation and 2/10 while at rest.  It is an intermittent throbbing pain that is worse with depending position.  Patient does find relief and comfort with walking boot.  He is also been using cryotherapy and acetaminophen 1000 mg twice daily.  Patient does state in dependent position he does get swelling and purplish color in his foot and ankle and occasionally some numbness and tingling in his toes.  He does have improved range of motion with flexion and extension however lateral flexion and extension still causes pain.  Does still use crutches intermittently.   Objective Findings: MSK: Patient is able to bear weight on right foot. Slightly antalgic gait.  Ankles: Diffuse lateral ankle edema and bruising noted.  ROM decreased with lateral flexion and extension due to swelling and pain.  Mildly decreased ROM with flexion and extension.  Tenderness to palpation on lateral ankle.  Patient is  neurovascularly intact, CRT less than 2 seconds.       Pre-Existing Condition(s):     Assessment:   Condition Improved    Status: Additional Care Required  Permanent Disability:No    Plan:   Comments:Tylenol 1000 mg up to 3 times daily as needed, ibuprofen 600 to 800 mg up to 3 times daily with food.  Continue with walking boot, crutches, and cryotherapy as needed.    Diagnostics:      Comments:       Disability Information   Status: Released to Restricted Duty    From:  4/27/2023  Through: 5/4/2023 Restrictions are: Temporary   Physical Restrictions   Sitting:    Standing:    Stooping:    Bending:      Squatting:    Walking:    Climbing:    Pushing:      Pulling:    Other:    Reaching Above Shoulder (L):   Reaching Above Shoulder (R):       Reaching Below Shoulder (L):    Reaching Below Shoulder (R):      Not to exceed Weight Limits   Carrying(hrs):   Weight Limit(lb):   Lifting(hrs):   Weight  Limit(lb):     Comments: Work restrictions include nonweightbearing on right foot and nonambulatory work.  Patient may do seated work only.  Continue with acetaminophen, Motrin, cryotherapy, walking boot, and crutches as needed.  Follow-up in 1 week.    Repetitive Actions   Hands: i.e. Fine Manipulations from Grasping:     Feet: i.e. Operating Foot Controls:     Driving / Operate Machinery:     Health Care Provider’s Original or Electronic Signature  GREGORY Guzmán. Health Care Provider’s Original or Electronic Signature    Alejo Johnson DO MPH     Clinic Name / Location: [address] [address] Clinic Phone Number: Dept: [phone]   Appointment Time: 12:00 Pm Visit Start Time: 12:34 PM   Check-In Time:  11:55 Am Visit Discharge Time: 1:34 PM   Original-Treating Physician or Chiropractor    Page 2-Insurer/TPA    Page 3-Employer    Page 4-Employee

## 2023-04-27 NOTE — PROGRESS NOTES
"Subjective:     Williams Armstrong is a 22 y.o. male who presents for Ankle Injury ( injury  4/24 F/u  feeling a little better, is able to move R ankle up and down with some pain. Hurts to move side to side, )      This patient's second visit  DOI: 4/24/2023 GARETT: Patient reports that he stepped out of a  and slipped off the step and landed directly onto his right ankle causing pain and he heard a popping sound in his ankle.  Patient was seen in [facility] for first Worker's Comp. visit.  X-rays were performed which did not show any fracture.  Patient presents today in a walking boot.  Pain level today is 6/10 with ambulation and 2/10 while at rest.  It is an intermittent throbbing pain that is worse with depending position.  Patient does find relief and comfort with walking boot.  He is also been using cryotherapy and acetaminophen 1000 mg twice daily.  Patient does state in dependent position he does get swelling and purplish color in his foot and ankle and occasionally some numbness and tingling in his toes.  He does have improved range of motion with flexion and extension however lateral flexion and extension still causes pain.  Does still use crutches intermittently.    PMH:   No pertinent past medical history to this problem  MEDS:  Medications were reviewed in EMR  ALLERGIES:  Allergies were reviewed in EMR  SOCHX:  Works as a   FH:   No pertinent family history to this problem       Objective:     /78   Pulse 91   Temp 36.8 °C (98.3 °F) (Temporal)   Resp 14   Ht 1.778 m (5' 10\")   Wt 112 kg (247 lb)   SpO2 96%   BMI 35.44 kg/m²     MSK: Patient is able to bear weight on right foot. Slightly antalgic gait.  Ankles: Diffuse lateral ankle edema and bruising noted.  ROM decreased with lateral flexion and extension due to swelling and pain.  Mildly decreased ROM with flexion and extension.  Tenderness to palpation on lateral ankle.  Patient is neurovascularly intact, CRT " less than 2 seconds.        Assessment/Plan:       1. Sprain of right ankle, unspecified ligament, initial encounter    2. Sprain of right foot, initial encounter    Released to Restricted Duty FROM 4/27/2023 TO 5/4/2023  Work restrictions include nonweightbearing on right foot and nonambulatory work.  Patient may do seated work only.  Continue with acetaminophen, Motrin, cryotherapy, walking boot, and crutches as needed.  Follow-up in 1 week.       Differential diagnosis, natural history, supportive care, and indications for immediate follow-up discussed.

## 2023-05-04 ENCOUNTER — OCCUPATIONAL MEDICINE (OUTPATIENT)
Dept: URGENT CARE | Facility: PHYSICIAN GROUP | Age: 23
End: 2023-05-04
Payer: COMMERCIAL

## 2023-05-04 VITALS
RESPIRATION RATE: 14 BRPM | HEIGHT: 70 IN | BODY MASS INDEX: 36.36 KG/M2 | TEMPERATURE: 98.1 F | OXYGEN SATURATION: 95 % | DIASTOLIC BLOOD PRESSURE: 82 MMHG | WEIGHT: 254 LBS | HEART RATE: 102 BPM | SYSTOLIC BLOOD PRESSURE: 130 MMHG

## 2023-05-04 DIAGNOSIS — S93.401A SPRAIN OF RIGHT ANKLE, UNSPECIFIED LIGAMENT, INITIAL ENCOUNTER: ICD-10-CM

## 2023-05-04 PROCEDURE — 99213 OFFICE O/P EST LOW 20 MIN: CPT | Performed by: NURSE PRACTITIONER

## 2023-05-04 ASSESSMENT — FIBROSIS 4 INDEX: FIB4 SCORE: 0.19

## 2023-05-04 NOTE — PROGRESS NOTES
"Subjective:     Williams Armstrong is a 22 y.o. male who presents for Foot Injury ( follow up on R ankle, is some what better, still having pain on and off, still swollen, )       DOI: 4/24/2023 GARETT: Patient reports that he stepped out of a  and slipped off the step and landed directly onto his right ankle causing pain and he heard a popping sound in his ankle.  Patient was seen in [facility] for first Worker's Comp. visit.  X-rays were performed which did not show any fracture.     Visit #3-patient states that the pain and swelling of his right ankle has improved since the last visit.  He has been using ice about 6 times per day and trying to keep it elevated.  He does continue to use Tylenol and Motrin to help with pain.  Yesterday he did take off his walking boot and has been just using a regular shoe today but has noticed a little bit of an increase in swelling at the ankle joint.  He does continue to express pain with ambulation and abduction and abduction of his ankle.  He denies pain with flexion and extension.  He denies the ankle becoming hot to the touch, erythematous, or any fevers.    PMH:   No pertinent past medical history to this problem  MEDS:  Medications were reviewed in EMR  ALLERGIES:  Allergies were reviewed in EMR  SOCHX:  Works as a OPERATOR  FH:   No pertinent family history to this problem       Objective:     /82   Pulse (!) 102   Temp 36.7 °C (98.1 °F) (Temporal)   Resp 14   Ht 1.778 m (5' 10\")   Wt 115 kg (254 lb)   SpO2 95%   BMI 36.45 kg/m²     MSK: Patient is able to bear weight on right foot. Slightly antalgic gait.  Ankles: Diffuse lateral ankle edema and bruising noted.  ROM decreased with lateral abduction and abduction of ankle due to swelling and pain.  Mildly decreased ROM with flexion and extension.  Tenderness to palpation on lateral ankle.  Patient is neurovascularly intact, CRT less than 2 seconds.     Assessment/Plan:       1. Sprain of right " ankle, unspecified ligament, initial encounter    Released to Restricted Duty FROM 5/4/2023 TO 5/11/2023  Work restrictions include weightbearing as tolerated on right foot for no more than 1 hour.  Patient may do seated work.  Continue with acetaminophen, Motrin, cryotherapy, and walking boot  Follow-up in 1 week.       My total time spent caring for the patient on the day of the encounter was 20 minutes.   This does not include time spent on separately billable procedures/tests.   Differential diagnosis, natural history, supportive care, and indications for immediate follow-up discussed.

## 2023-05-04 NOTE — LETTER
[facility]  Gayatri [place] [address]  Phone:  [phone] - Fax:  [phone]   [facility] Progress Report and Disability Certification  Date of Service: 5/4/2023   No Show:  No  Date / Time of Next Visit: 5/11/2023   Claim Information   Patient Name: Williams Armstrong  Claim Number:     Employer:   [employer] Date of Injury: 4/24/2023     Insurer / TPA: Mavis Gracia  ID / SSN:     Occupation:   Diagnosis: The encounter diagnosis was Sprain of right ankle, unspecified ligament, initial encounter.    Medical Information   Related to Industrial Injury? Yes    Subjective Complaints:   DOI: 4/24/2023 GARETT: Patient reports that he stepped out of a  and slipped off the step and landed directly onto his right ankle causing pain and he heard a popping sound in his ankle.  Patient was seen in [facility] for first Worker's Comp. visit.  X-rays were performed which did not show any fracture.     Visit #3-patient states that the pain and swelling of his right ankle has improved since the last visit.  He has been using ice about 6 times per day and trying to keep it elevated.  He does continue to use Tylenol and Motrin to help with pain.  Yesterday he did take off his walking boot and has been just using a regular shoe today but has noticed a little bit of an increase in swelling at the ankle joint.  He does continue to express pain with ambulation and abduction and abduction of his ankle.  He denies pain with flexion and extension.  He denies the ankle becoming hot to the touch, erythematous, or any fevers.   Objective Findings: MSK: Patient is able to bear weight on right foot. Slightly antalgic gait.  Ankles: Diffuse lateral ankle edema and bruising noted.  ROM decreased with lateral abduction and abduction of ankle due to swelling and pain.  Mildly decreased ROM with flexion and extension.  Tenderness to palpation on lateral  ankle.  Patient is neurovascularly intact, CRT less than 2 seconds.    Pre-Existing Condition(s):     Assessment:   Condition Improved    Status: Additional Care Required  Permanent Disability:No    Plan: Medication  Comments:Continue with acetaminophen and Motrin OTC    Diagnostics:      Comments:       Disability Information   Status: Released to Restricted Duty    From:  5/4/2023  Through: 5/11/2023 Restrictions are: Temporary   Physical Restrictions   Sitting:    Standing:    Stooping:    Bending:      Squatting:    Walking:    Climbing:    Pushing:      Pulling:    Other:    Reaching Above Shoulder (L):   Reaching Above Shoulder (R):       Reaching Below Shoulder (L):    Reaching Below Shoulder (R):      Not to exceed Weight Limits   Carrying(hrs):   Weight Limit(lb):   Lifting(hrs):   Weight  Limit(lb):     Comments: Work restrictions include weightbearing as tolerated on right foot for no more than 1 hour.  Patient may do seated work.  Continue with acetaminophen, Motrin, cryotherapy, and walking boot  Follow-up in 1 week.    Repetitive Actions   Hands: i.e. Fine Manipulations from Grasping:     Feet: i.e. Operating Foot Controls:     Driving / Operate Machinery:     Health Care Provider’s Original or Electronic Signature  PHU Guzmán Health Care Provider’s Original or Electronic Signature    Alejo Johnson DO MPH     Clinic Name / Location: [address] [address] Clinic Phone Number: Dept: [phone]   Appointment Time: 11:00 Am Visit Start Time: 10:54 AM   Check-In Time:  10:41 Am Visit Discharge Time:  11:23 AM   Original-Treating Physician or Chiropractor    Page 2-Insurer/TPA    Page 3-Employer    Page 4-Employee

## 2023-05-11 ENCOUNTER — OCCUPATIONAL MEDICINE (OUTPATIENT)
Dept: URGENT CARE | Facility: PHYSICIAN GROUP | Age: 23
End: 2023-05-11
Payer: COMMERCIAL

## 2023-05-11 VITALS
DIASTOLIC BLOOD PRESSURE: 82 MMHG | BODY MASS INDEX: 36.36 KG/M2 | SYSTOLIC BLOOD PRESSURE: 132 MMHG | OXYGEN SATURATION: 96 % | WEIGHT: 254 LBS | HEART RATE: 90 BPM | HEIGHT: 70 IN | RESPIRATION RATE: 18 BRPM | TEMPERATURE: 98.1 F

## 2023-05-11 DIAGNOSIS — S93.401A SPRAIN OF RIGHT ANKLE, UNSPECIFIED LIGAMENT, INITIAL ENCOUNTER: ICD-10-CM

## 2023-05-11 PROCEDURE — 99213 OFFICE O/P EST LOW 20 MIN: CPT | Performed by: NURSE PRACTITIONER

## 2023-05-11 ASSESSMENT — FIBROSIS 4 INDEX: FIB4 SCORE: 0.19

## 2023-05-11 NOTE — PROGRESS NOTES
"Subjective:     Williams Armstrong is a 22 y.o. male who presents for Ankle Injury ( follow up on R ankle, feeling a little better 4-5/10, able to put more weight on it, )      DOI: 4/24/2023 GARETT: Patient reports that he stepped out of a  and slipped off the step and landed directly onto his right ankle causing pain and he heard a popping sound in his ankle.  Patient was seen in [facility] for first Worker's Comp. visit.  X-rays were performed which did not show any fracture.     Visit #4-patient states today that he does have improvement in both the swelling, pain, and range of motion in his right ankle.  He has been using his walking boot.  Work has been able to accommodate him.  Patient does state that he still does have pain mainly with abduction and abduction of his ankle.  He does continue with Tylenol and Motrin as needed.  He has been elevating and icing his ankle throughout the day.  Patient denies any heat to his ankle, redness, or any fevers    PMH:   No pertinent past medical history to this problem  MEDS:  Medications were reviewed in EMR  ALLERGIES:  Allergies were reviewed in EMR  SOCHX:  Works as a    FH:   No pertinent family history to this problem       Objective:     /82   Pulse 90   Temp 36.7 °C (98.1 °F) (Temporal)   Resp 18   Ht 1.778 m (5' 10\")   Wt 115 kg (254 lb)   SpO2 96%   BMI 36.45 kg/m²     MSK: Patient is able to bear weight on right foot. Slightly antalgic gait.  Ankles: Diffuse lateral ankle edema. No bruising noted.  ROM decreased with lateral abduction and abduction of ankle due to swelling and pain.  Mildly decreased ROM with flexion and extension.  Tenderness to palpation on lateral ankle.  Patient is neurovascularly intact, CRT less than 2 seconds.     Assessment/Plan:       1. Sprain of right ankle, unspecified ligament, initial encounter  - Referral to Physical Therapy      FROM   TO    Work restrictions include weightbearing as tolerated " on right foot for no more than 2 hours.  Use walking boot as needed for pain and swelling.    No operating heavy equipment.   Continue with acetaminophen, Motrin, cryotherapy, elevation, and walking boot as needed  Referral placed to physical therapy.  Follow-up in 2 week.         Differential diagnosis, natural history, supportive care, and indications for immediate follow-up discussed.

## 2023-05-11 NOTE — LETTER
[facility]  [place] [address]  Phone:  [phone] - Fax:  [phone]   [facility] Progress Report and Disability Certification  Date of Service: 5/11/2023   No Show:  No  Date / Time of Next Visit:     Claim Information   Patient Name: Williams Armstrong  Claim Number:     Employer:   [employer] Date of Injury: 4/24/2023     Insurer / TPA: Mavis Gracia  ID / SSN:     Occupation:   Diagnosis: The encounter diagnosis was Sprain of right ankle, unspecified ligament, initial encounter.    Medical Information   Related to Industrial Injury? Yes    Subjective Complaints:  DOI: 4/24/2023 GARETT: Patient reports that he stepped out of a  and slipped off the step and landed directly onto his right ankle causing pain and he heard a popping sound in his ankle.  Patient was seen in [facility] for first Worker's Comp. visit.  X-rays were performed which did not show any fracture.     Visit #4-patient states today that he does have improvement in both the swelling, pain, and range of motion in his right ankle.  He has been using his walking boot.  Work has been able to accommodate him.  Patient does state that he still does have pain mainly with abduction and abduction of his ankle.  He does continue with Tylenol and Motrin as needed.  He has been elevating and icing his ankle throughout the day.  Patient denies any heat to his ankle, redness, or any fevers   Objective Findings: MSK: Patient is able to bear weight on right foot. Slightly antalgic gait.  Ankles: Diffuse lateral ankle edema. No bruising noted.  ROM decreased with lateral abduction and abduction of ankle due to swelling and pain.  Mildly decreased ROM with flexion and extension.  Tenderness to palpation on lateral ankle.  Patient is neurovascularly intact, CRT less than 2 seconds.    Pre-Existing Condition(s):     Assessment:   Initial Visit    Status: Additional  Care Required  Permanent Disability:No    Plan:   Comments:Continue on acetaminophen 1000 mg up to 3 times daily and or ibuprofen 600 mg 3 times daily with food.    Diagnostics:      Comments:       Disability Information   Status:      From:     Through:   Restrictions are:     Physical Restrictions   Sitting:    Standing:    Stooping:    Bending:      Squatting:    Walking:    Climbing:    Pushing:      Pulling:    Other:    Reaching Above Shoulder (L):   Reaching Above Shoulder (R):       Reaching Below Shoulder (L):    Reaching Below Shoulder (R):      Not to exceed Weight Limits   Carrying(hrs):   Weight Limit(lb):   Lifting(hrs):   Weight  Limit(lb):     Comments: Work restrictions include weightbearing as tolerated on right foot for no more than 2 hours.  Use walking boot as needed for pain and swelling.    No operating heavy equipment.   Continue with acetaminophen, Motrin, cryotherapy, elevation, and walking boot as needed  Referral placed to physical therapy.  Follow-up in 2 week.      Repetitive Actions   Hands: i.e. Fine Manipulations from Grasping:     Feet: i.e. Operating Foot Controls:     Driving / Operate Machinery:     Health Care Provider’s Original or Electronic Signature  Blessing Gunter A.P.R.N. Health Care Provider’s Original or Electronic Signature    Alejo Johnson DO MPH     Clinic Name / Location: [address] [address] Clinic Phone Number: Dept: [phone]   Appointment Time: 1:00 Pm Visit Start Time: 1:39 PM   Check-In Time:  12:55 Pm Visit Discharge Time:  2:18 pm    Original-Treating Physician or Chiropractor    Page 2-Insurer/TPA    Page 3-Employer    Page 4-Employee

## 2023-05-25 ENCOUNTER — OCCUPATIONAL MEDICINE (OUTPATIENT)
Dept: URGENT CARE | Facility: PHYSICIAN GROUP | Age: 23
End: 2023-05-25
Payer: COMMERCIAL

## 2023-05-25 VITALS
TEMPERATURE: 96.9 F | HEIGHT: 70 IN | BODY MASS INDEX: 36.36 KG/M2 | OXYGEN SATURATION: 96 % | DIASTOLIC BLOOD PRESSURE: 78 MMHG | WEIGHT: 254 LBS | SYSTOLIC BLOOD PRESSURE: 128 MMHG | RESPIRATION RATE: 14 BRPM | HEART RATE: 79 BPM

## 2023-05-25 DIAGNOSIS — S93.401A SPRAIN OF RIGHT ANKLE, UNSPECIFIED LIGAMENT, INITIAL ENCOUNTER: ICD-10-CM

## 2023-05-25 PROCEDURE — 99212 OFFICE O/P EST SF 10 MIN: CPT | Performed by: NURSE PRACTITIONER

## 2023-05-25 PROCEDURE — 3078F DIAST BP <80 MM HG: CPT | Performed by: NURSE PRACTITIONER

## 2023-05-25 PROCEDURE — 3074F SYST BP LT 130 MM HG: CPT | Performed by: NURSE PRACTITIONER

## 2023-05-25 ASSESSMENT — FIBROSIS 4 INDEX: FIB4 SCORE: 0.19

## 2023-05-25 NOTE — PROGRESS NOTES
"Subjective:     Williams Armstrong is a 22 y.o. male who presents for Ankle Injury (WC follow up R ankle feeling better, has full range of motion, would like to be released to full duty )      Visit #5-patient presents in clinic today stating that he no longer has pain in his right ankle.  He does continue to have mild swelling.  He is able to ambulate normally without any assistance or walking boot.  He states that he does not have any numbness or tingling in his foot or ankle.  He denies any weakness.  He has not needed to take any analgesics.  He does continue to use cryotherapy occasionally.    Copied from previous chart  DOI: 4/24/2023 GARETT: Patient reports that he stepped out of a  and slipped off the step and landed directly onto his right ankle causing pain and he heard a popping sound in his ankle.  Patient was seen in [facility] for first Worker's Comp. visit.  X-rays were performed which did not show any fracture.     PMH:   No pertinent past medical history to this problem  MEDS:  Medications were reviewed in EMR  ALLERGIES:  Allergies were reviewed in EMR  SOCHX:  Works as a OPERATOR  FH:   No pertinent family history to this problem       Objective:     /78   Pulse 79   Temp 36.1 °C (96.9 °F) (Temporal)   Resp 14   Ht 1.778 m (5' 10\")   Wt 115 kg (254 lb)   SpO2 96%   BMI 36.45 kg/m²     MSK: Patient is able to bear weight on right foot.  Normal gait  Ankles: Mild lateral right ankle swelling.. No bruising noted.  Full range of motion. Non Tenderness to palpation of  ankle, foot, or Achilles tendon.  Patient is neurovascularly intact, CRT less than 2 seconds.     Assessment/Plan:       1. Sprain of right ankle, unspecified ligament, initial encounter  Resolved.    Released to Full Duty FROM   TO            Differential diagnosis, natural history, supportive care, and indications for immediate follow-up discussed.    "

## 2023-05-25 NOTE — LETTER
[facility]  [place] [address]  Phone:  [phone] - Fax:  [phone]   [facility] Progress Report and Disability Certification  Date of Service: 5/25/2023   No Show:  No  Date / Time of Next Visit:     Claim Information   Patient Name: Williams Armstrong  Claim Number:     Employer:   [employer] Date of Injury: 4/24/2023     Insurer / TPA: Mavis Gracia  ID / SSN:     Occupation:   Diagnosis: There were no encounter diagnoses.    Medical Information   Related to Industrial Injury?      Subjective Complaints:  Visit #5-patient presents in clinic today stating that he no longer has pain in his right ankle.  He does continue to have mild swelling.  He is able to ambulate normally without any assistance or walking boot.  He states that he does not have any numbness or tingling in his foot or ankle.  He denies any weakness.  He has not needed to take any analgesics.  He does continue to use cryotherapy occasionally.    Copied from previous chart  DOI: 4/24/2023 GARETT: Patient reports that he stepped out of a  and slipped off the step and landed directly onto his right ankle causing pain and he heard a popping sound in his ankle.  Patient was seen in [facility] for first Worker's Comp. visit.  X-rays were performed which did not show any fracture.    Objective Findings: MSK: Patient is able to bear weight on right foot.  Normal gait  Ankles: Mild lateral right ankle swelling.. No bruising noted.  Full range of motion. Non Tenderness to palpation of  ankle, foot, or Achilles tendon.  Patient is neurovascularly intact, CRT less than 2 seconds.    Pre-Existing Condition(s):     Assessment:   Condition Improved    Status: Discharged /  MMI  Permanent Disability:     Plan:      Diagnostics:      Comments:       Disability Information   Status: Released to Full Duty    From:     Through:   Restrictions are:     Physical  Restrictions   Sitting:    Standing:    Stooping:    Bending:      Squatting:    Walking:    Climbing:    Pushing:      Pulling:    Other:    Reaching Above Shoulder (L):   Reaching Above Shoulder (R):       Reaching Below Shoulder (L):    Reaching Below Shoulder (R):      Not to exceed Weight Limits   Carrying(hrs):   Weight Limit(lb):   Lifting(hrs):   Weight  Limit(lb):     Comments:      Repetitive Actions   Hands: i.e. Fine Manipulations from Grasping:     Feet: i.e. Operating Foot Controls:     Driving / Operate Machinery:     Health Care Provider’s Original or Electronic Signature  HOLLY GuzmánRJimmyN. Health Care Provider’s Original or Electronic Signature    Alejo Johnson DO MPH     Clinic Name / Location: [address] [address] Clinic Phone Number: Dept: [phone]   Appointment Time: 12:00 Pm Visit Start Time: 12:35 PM   Check-In Time:  11:43 Am Visit Discharge Time:  1:14 PM   Original-Treating Physician or Chiropractor    Page 2-Insurer/TPA    Page 3-Employer    Page 4-Employee

## 2024-10-17 ENCOUNTER — NON-PROVIDER VISIT (OUTPATIENT)
Dept: URGENT CARE | Facility: PHYSICIAN GROUP | Age: 24
End: 2024-10-17

## 2024-10-17 DIAGNOSIS — Z02.1 PRE-EMPLOYMENT DRUG SCREENING: ICD-10-CM

## 2024-10-17 PROCEDURE — 80305 DRUG TEST PRSMV DIR OPT OBS: CPT | Performed by: STUDENT IN AN ORGANIZED HEALTH CARE EDUCATION/TRAINING PROGRAM

## 2025-01-02 ENCOUNTER — OFFICE VISIT (OUTPATIENT)
Dept: URGENT CARE | Facility: PHYSICIAN GROUP | Age: 25
End: 2025-01-02

## 2025-01-02 ENCOUNTER — NON-PROVIDER VISIT (OUTPATIENT)
Dept: URGENT CARE | Facility: PHYSICIAN GROUP | Age: 25
End: 2025-01-02

## 2025-01-02 DIAGNOSIS — Z02.1 PRE-EMPLOYMENT DRUG SCREENING: ICD-10-CM

## 2025-01-02 DIAGNOSIS — Z02.83 ENCOUNTER FOR DRUG SCREENING: ICD-10-CM

## 2025-02-19 ENCOUNTER — OCCUPATIONAL MEDICINE (OUTPATIENT)
Dept: URGENT CARE | Facility: CLINIC | Age: 25
End: 2025-02-19
Payer: OTHER MISCELLANEOUS

## 2025-02-19 ENCOUNTER — NON-PROVIDER VISIT (OUTPATIENT)
Dept: URGENT CARE | Facility: CLINIC | Age: 25
End: 2025-02-19
Payer: OTHER MISCELLANEOUS

## 2025-02-19 VITALS
OXYGEN SATURATION: 96 % | HEART RATE: 80 BPM | DIASTOLIC BLOOD PRESSURE: 80 MMHG | HEIGHT: 70 IN | TEMPERATURE: 97.5 F | RESPIRATION RATE: 14 BRPM | SYSTOLIC BLOOD PRESSURE: 126 MMHG | BODY MASS INDEX: 36.58 KG/M2 | WEIGHT: 255.51 LBS

## 2025-02-19 DIAGNOSIS — H53.141 PHOTOPHOBIA OF RIGHT EYE: ICD-10-CM

## 2025-02-19 DIAGNOSIS — S05.01XA INJURY OF CONJUNCTIVA AND CORNEAL ABRASION OF RIGHT EYE W/O FB, INITIAL ENCOUNTER: ICD-10-CM
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
## 2025-02-19 DIAGNOSIS — Z02.1 PRE-EMPLOYMENT DRUG SCREENING: ICD-10-CM

## 2025-02-19 LAB
AMP AMPHETAMINE: NORMAL
BREATH ALCOHOL COMMENT: NEGATIVE
COC COCAINE: NORMAL
INT CON NEG: NORMAL
INT CON POS: NORMAL
MET METHAMPHETAMINES: NORMAL
OPI OPIATES: NORMAL
PCP PHENCYCLIDINE: NORMAL
POC BREATHALIZER: 0 PERCENT (ref 0–0.01)
POC DRUG COMMENT 753798-OCCUPATIONAL HEALTH: NEGATIVE
THC: NORMAL

## 2025-02-19 PROCEDURE — 3074F SYST BP LT 130 MM HG: CPT | Performed by: PHYSICIAN ASSISTANT

## 2025-02-19 PROCEDURE — 99214 OFFICE O/P EST MOD 30 MIN: CPT | Performed by: PHYSICIAN ASSISTANT

## 2025-02-19 PROCEDURE — 80305 DRUG TEST PRSMV DIR OPT OBS: CPT | Performed by: PHYSICIAN ASSISTANT

## 2025-02-19 PROCEDURE — 1125F AMNT PAIN NOTED PAIN PRSNT: CPT | Performed by: PHYSICIAN ASSISTANT

## 2025-02-19 PROCEDURE — 3079F DIAST BP 80-89 MM HG: CPT | Performed by: PHYSICIAN ASSISTANT

## 2025-02-19 PROCEDURE — 82075 ASSAY OF BREATH ETHANOL: CPT | Performed by: PHYSICIAN ASSISTANT

## 2025-02-19 RX ORDER — ERYTHROMYCIN 5 MG/G
OINTMENT OPHTHALMIC
Qty: 3.5 G | Refills: 0 | Status: SHIPPED | OUTPATIENT
Start: 2025-02-19

## 2025-02-19 ASSESSMENT — ENCOUNTER SYMPTOMS
BLURRED VISION: 1
PHOTOPHOBIA: 1
EYE REDNESS: 1
EYE PAIN: 1

## 2025-02-19 ASSESSMENT — PAIN SCALES - GENERAL: PAINLEVEL_OUTOF10: 9=SEVERE PAIN

## 2025-02-19 ASSESSMENT — VISUAL ACUITY
OD_CC: 20/70
OS_CC: 20/20

## 2025-02-19 NOTE — PROGRESS NOTES
"Subjective     Williams Jose Armstrong is a 24 y.o. male who presents with Work-Related Injury ([employer] W/C DOI 2/19/25 right eye)    Pt PMH, SocHx, SurgHx, FamHx, Drug allergies and medications reviewed with pt/EPIC.      Family history reviewed, it is not pertinent to this complaint.           Employer's Name:  [employer]    Patient presents with:  Work-Related Injury: [employer] W/[employer] DOI 2/19/25 right eye      Date of Injury:  2/19/2025    Mechanism of Injury:  I was welding a joist with my head tilted to the left when a hot spark landed right in the corner of my right eye  [employer]               Location of Injury:  Workers' Compensation, Eye (R)  N/A     Patient has taken some over-the-counter ibuprofen prior to arrival due to pain.  Patient also flushed eye at the worksite prior to arrival.  Patient complaining of significant photophobia, and pain in the medial aspect of the eye.  Blurry vision in the right eye, patient does wear glasses for distance vision.            Review of Systems   Eyes:  Positive for blurred vision, photophobia, pain and redness.   All other systems reviewed and are negative.             Objective     /80   Pulse 80   Temp 36.4 °C (97.5 °F) (Temporal)   Resp 14   Ht 1.778 m (5' 10\")   Wt 116 kg (255 lb 8.2 oz)   SpO2 96%   BMI 36.66 kg/m²      Physical Exam  Vitals and nursing note reviewed.   Constitutional:       General: He is not in acute distress.     Appearance: Normal appearance. He is well-developed. He is not ill-appearing or toxic-appearing.   HENT:      Head: Normocephalic and atraumatic.      Nose: Nose normal.      Mouth/Throat:      Lips: Pink.      Mouth: Mucous membranes are moist.   Eyes:      General: Lids are normal. Lids are everted, no foreign bodies appreciated. Gaze aligned appropriately. No visual field deficit or scleral icterus.        Right eye: No foreign body, discharge or hordeolum.      Extraocular Movements: Extraocular movements " intact.      Conjunctiva/sclera:      Right eye: Right conjunctiva is injected. No chemosis, exudate or hemorrhage.     Pupils: Pupils are equal, round, and reactive to light.      Right eye: Pupil is round, reactive and not sluggish. Corneal abrasion and fluorescein uptake present. Yessenia exam negative.      Funduscopic exam:     Right eye: No hemorrhage or exudate. Red reflex present.      Slit lamp exam:     Right eye: Anterior chamber quiet. No foreign body.        Comments: Exam performed with a Woods lamp not a slit-lamp.   Neck:      Vascular: No JVD.   Cardiovascular:      Rate and Rhythm: Normal rate and regular rhythm.      Heart sounds: Normal heart sounds.   Pulmonary:      Effort: Pulmonary effort is normal.      Breath sounds: Normal breath sounds.   Abdominal:      Palpations: Abdomen is soft.   Musculoskeletal:         General: Normal range of motion.      Cervical back: Normal range of motion and neck supple.   Lymphadenopathy:      Cervical: No cervical adenopathy.   Skin:     General: Skin is warm and dry.      Capillary Refill: Capillary refill takes less than 2 seconds.   Neurological:      Mental Status: He is alert and oriented to person, place, and time.      Gait: Gait normal.   Psychiatric:         Mood and Affect: Mood normal.         Behavior: Behavior is cooperative.         Right eye exam: VA R 20/70, L 20/20, B 20/20 with glasses.  Moderate diffuse erythema with flurescien uptake to medial aspect of conjunctiva, other quinonez no uptake.  Neg Yessenia sign.  PERRLA, EOMI,  significant photophobia.                         Assessment & Plan  Injury of conjunctiva and corneal abrasion of right eye w/o FB, initial encounter    Orders:    erythromycin 5 MG/GM Ointment; Pt to apply 1/4 inch ribbon to lower lid of affected eye 3 times daily x 5 days.    Photophobia of right eye            Conjunctival and cornea abrasion will be treated with erythromycin ointment 3 times daily x 5  days.    Motrin/Advil/Ibuprophen 600 mg every 6 hours as needed for pain or fever.    PT can use cool/warm compress on affected area for relief of symptoms.     Follow D-39 instructions/restrictions. Return to clinic as scheduled.     Differential diagnosis, supportive care, and indications for immediate follow-up discussed with patient.  Instructed to return to clinic or nearest emergency department for any change in condition, further concerns, or worsening of symptoms.    I personally reviewed prior external notes and test results pertinent to today's visit.  I have independently reviewed and interpreted all diagnostics ordered during this urgent care visit.    PT should follow up with PCP in 1-2 days for re-evaluation if symptoms have not improved.      Discussed red flags and reasons to return to UC or ED.      Pt and/or family verbalized understanding of diagnosis and follow up instructions and was offered informational handout on diagnosis.  PT discharged.     Please note that this dictation was created using voice recognition software. I have made every reasonable attempt to correct obvious errors, but I expect that there may be errors of grammar and possibly content that I did not discover before finalizing the note.

## 2025-02-19 NOTE — PROGRESS NOTES
Williams Jose Armstrong is a 24 y.o. male here for a non-provider visit for [employer] UDS and BAT    If abnormal was an in office provider notified today (if so, indicate provider)? No    Routed to PCP? No

## 2025-02-19 NOTE — LETTER
PHYSICIAN’S AND CHIROPRACTIC PHYSICIAN'S   PROGRESS REPORT   CERTIFICATION OF DISABILITY Claim Number:     Social Security Number:    Patient’s Name: Williams Armstrong Date of Injury: 2/19/2025   Employer: [employer] Name of MCO (if applicable):      Patient’s Job Description/Occupation:        Previous Injuries/Diseases/Surgeries Contributing to the Condition:  no      Diagnosis: (S05.01XA) Injury of conjunctiva and corneal abrasion of right eye w/o FB, initial encounter  (H53.141) Photophobia of right eye      Related to the Industrial Injury? Yes     Explain: Employer's Name:  [unfilled]    @CC@    Date of Injury:  2/19/2025    Mechanism of Injury:  I was welding a joist with my head tilted to the left when a hot spark landed right in the corner of my right eye  Welding  @[facility]([id number])@            Location of Injury:  Workers' Compensation, Eye (R)  N/A     PT wears glasses and was wearing a shield at the time of the injury.  Spark flew under both while head was tilted to the left.  Pt co blurry vision and pain/pressure in eye.  PT wears glasses for farsightedness.      Objective Medical Findings: Right eye exam: VA R 20/70, L 20/20, B 20/20 with glasses.  Moderate diffuse erythema with flurescien uptake to medial aspect of conjunctiva, other quinonez no uptake.  Neg Yessenia sign.  PERRLA, EOMI,  significant photophobia.          None - Discharged                         Stable  No                 Ratable  No        Generally Improved                      X   Condition Worsened                  Condition Same  May Have Suffered a Permanent Disability No     Treatment Plan:    Rx erythromycin ointment to right eye TID x 5 days.  OtC motrin as needed for pain and headache.          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  Can do other duties if available.        No Bending at Waist     No Stooping     No Lifting        No Carrying     No Walking Lifting Restricted to (lbs.):          No Pushing        No Climbing     No Reaching Above Shoulders       Date of Next Visit:  2/22/2025 Date of this Exam: 2/19/2025 Physician/Chiropractic Physician Name: Katie Hitchcock P.A.-C. Physician/Chiropractic Physician Signature:  Alejo Johnson DO MPH     [place]:  [address] [place] [address], [address] [place], [place] [address] - Telephone [phone] [place]:  [address] [place] [address], [address] [place], [place] [address] - Telephone [phone]    [email or web address]  D-39 (Rev. 10/24)

## 2025-02-19 NOTE — LETTER
"    EMPLOYEE’S CLAIM FOR COMPENSATION/ REPORT OF INITIAL TREATMENT  FORM C-4  PLEASE TYPE OR PRINT    EMPLOYEE’S CLAIM - PROVIDE ALL INFORMATION REQUESTED   First Name                    ISHMAEL Kramer                  Last Name  Nathaniel Birthdate                    2000                Sex  Male Claim Number (Insurer’s Use Only)     Home Address  [address] Age  24 y.o. Height  1.778 m (5' 10\") Weight  116 kg (255 lb 8.2 oz) Social Security Number     [facility] Zip  [address] Telephone  [phone] (home)    Mailing Address  [address] [facility] Zip  [address] Primary Language Spoken  English    INSURER  [employer] THIRD-PARTY   [employer]   Employee's Occupation (Job Title) When Injury or Occupational Disease Occurred      Employer's Name/Company Name  [employer]  Telephone  [phone]    Office Mail Address (Number and Street)  [address] [place] [address]     Date of Injury (if applicable) 2/19/2025               Hours Injury (if applicable)  5:30 AM Date Employer Notified  2/19/2025 Last Day of Work after Injury or Occupational Disease  2/19/2025 Supervisor to Whom Injury Reported  Farnaz Beyer   Address or Location of Accident (if applicable)  Work [1]   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 (Be specific and answer in detail. Use additional sheet if necessary)  I was welding a joist with my head tilted to the left when a hot spark landed right in the corner of my right eye   If you believe that you have an occupational disease, when did you first have knowledge of the disability and its relationship to your employment?   Witnesses to the Accident (if applicable)  Co-worker      Nature of Injury or Occupational Disease  Workers' " Compensation  Part(s) of Body Injured or Affected  Eye (R) N/A N/A    I CERTIFY THAT THE ABOVE IS TRUE AND CORRECT TO T HE BEST OF MY KNOWLEDGE AND THAT I HAVE PROVIDED THIS INFORMATION IN ORDER TO OBTAIN THE BENEFITS OF [place]CC[place], OR CHAPTER [place]  I HEREBY AUTHORIZE ANY PHYSICIAN, CHIROPRACTOR, SURGEON, PRACTITIONER OR ANY OTHER PERSON, ANY HOSPITAL, INCLUDING [facility] OR [facility], ANY  MEDICAL SERVICE ORGANIZATION, ANY INSURANCE COMPANY, OR OTHER INSTITUTION OR ORGANIZATION TO RELEASE TO EACH OTHER, ANY MEDICAL OR OTHER INFORMATION, INCLUDING BENEFITS PAID OR PAYABLE, PERTINENT TO THIS INJURY OR DISEASE, EXCEPT INFORMATION RELATIVE TO DIAGNOSIS, TREATMENT AND/OR COUNSELING FOR AIDS, PSYCHOLOGICAL CONDITIONS, ALCOHOL OR CONTROLLED SUBSTANCES, FOR WHICH I MUST GIVE SPECIFIC AUTHORIZATION.  A PHOTOSTAT OF THIS AUTHORIZATION SHALL BE VALID AS THE ORIGINAL.     Date 2/19/25   Place [facility] Employee’s Original or  *Electronic Signature   THIS REPORT MUST BE COMPLETED AND MAILED WITHIN 3 WORKING DAYS OF TREATMENT   Place  [facility]    Name of Facility  [facility]   Date 2/19/2025 Diagnosis and Description of Injury or Occupational Disease  (S05.01XA) Injury of conjunctiva and corneal abrasion of right eye w/o FB, initial encounter  (H53.141) Photophobia of right eye  Diagnoses of Injury of conjunctiva and corneal abrasion of right eye w/o FB, initial encounter and Photophobia of right eye were pertinent to this visit. Is there evidence that the injured employee was under the influence of alcohol and/or another controlled substance at the time of accident?  []No  [] Yes (if yes, please explain)   Hour 8:26 AM  No   Treatment: Rx erythromycin ointment to right eye TID x 5 days.  OtC motrin as needed for pain and headache.     Have you advised the patient to remain off work five  days or more?   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    X-Ray Findings:      From information given by the employee, together with medical evidence, can you directly connect this injury or occupational disease as job incurred?  []Yes   [] No Yes    Is additional medical care by a physician indicated? []Yes [] No  Yes    Do you know of any previous injury or disease contributing to this condition or occupational disease? []Yes [] No (Explain if yes)                          No   Date  2/19/2025 Print Health Care Provider’s Name  Duran Hitchcock P.A.-C. I certify that the employer’s copy of  this form was delivered to the employer on:   Address  [address], [address] INSURER'S USE ONLY                       [facility] Zip  [address] Provider’s Tax ID Number  [id number]   Telephone  Dept: [phone]    Health Care Provider’s Original or Electronic Signature  e-SignSDURAN JUDGE P.A.-C. Degree (MD,DO, DC,PAGABI,APRN)  PAGABI  Choose (if applicable)      ORIGINAL - TREATING HEALTHCARE PROVIDER PAGE 2 - INSURER/TPA PAGE 3 - EMPLOYER PAGE 4 - EMPLOYEE             Form C-4 (rev.08/23)

## 2025-02-19 NOTE — LETTER
PHYSICIAN’S AND CHIROPRACTIC PHYSICIAN'S   PROGRESS REPORT   CERTIFICATION OF DISABILITY Claim Number:     Social Security Number:    Patient’s Name: Williams Armstrong Date of Injury: 2/19/2025   Employer: [employer] Name of MCO (if applicable):      Patient’s Job Description/Occupation:        Previous Injuries/Diseases/Surgeries Contributing to the Condition:  no      Diagnosis: (S05.01XA) Injury of conjunctiva and corneal abrasion of right eye w/o FB, initial encounter  (H53.141) Photophobia of right eye      Related to the Industrial Injury? Yes     Explain: Employer's Name:  [unfilled]    @CC@    Date of Injury:  2/19/2025    Mechanism of Injury:  I was welding a joist with my head tilted to the left when a hot spark landed right in the corner of my right eye  Welding  @[facility]([id number])@            Location of Injury:  Workers' Compensation, Eye (R)  N/A     PT wears glasses and was wearing a shield at the time of the injury.  Spark flew under both while head was tilted to the left.  Pt co blurry vision and pain/pressure in eye.  PT wears glasses for farsightedness.      Objective Medical Findings: Right eye exam: VA R 20/70, L 20/20, B 20/20 with glasses.  Moderate diffuse erythema with flurescien uptake to medial aspect of conjunctiva, other quinonez no uptake.  Neg Yessenia sign.  PERRLA, EOMI,  significant photophobia.          None - Discharged                         Stable  No                 Ratable  No        Generally Improved                      X   Condition Worsened                  Condition Same  May Have Suffered a Permanent Disability No     Treatment Plan:    Rx erythromycin ointment to right eye TID x 5 days.  OtC motrin as needed for pain and headache.          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  Can do other duties if available.        No Bending at Waist     No Stooping     No Lifting        No Carrying     No Walking Lifting Restricted to (lbs.):          No Pushing        No Climbing     No Reaching Above Shoulders       Date of Next Visit:  2/22/2025 Date of this Exam: 2/19/2025 Physician/Chiropractic Physician Name: Katie Hitchcock P.A.-C. Physician/Chiropractic Physician Signature:  Alejo Johnson DO MPH     [place]:  [address] [place] [address], [address] [place], [place] [address] - Telephone [phone] [place]:  [address] [place] [address], [address] [place], [place] [address] - Telephone [phone]    [email or web address]  D-39 (Rev. 10/24)

## 2025-02-22 ENCOUNTER — OCCUPATIONAL MEDICINE (OUTPATIENT)
Dept: URGENT CARE | Facility: PHYSICIAN GROUP | Age: 25
End: 2025-02-22
Payer: COMMERCIAL

## 2025-02-22 VITALS
BODY MASS INDEX: 35.49 KG/M2 | SYSTOLIC BLOOD PRESSURE: 122 MMHG | OXYGEN SATURATION: 98 % | TEMPERATURE: 98.4 F | DIASTOLIC BLOOD PRESSURE: 80 MMHG | HEART RATE: 81 BPM | HEIGHT: 71 IN | WEIGHT: 253.5 LBS | RESPIRATION RATE: 16 BRPM

## 2025-02-22 DIAGNOSIS — S05.01XD: ICD-10-CM

## 2025-02-22 PROCEDURE — 3079F DIAST BP 80-89 MM HG: CPT | Performed by: NURSE PRACTITIONER

## 2025-02-22 PROCEDURE — 99213 OFFICE O/P EST LOW 20 MIN: CPT | Performed by: NURSE PRACTITIONER

## 2025-02-22 PROCEDURE — 3074F SYST BP LT 130 MM HG: CPT | Performed by: NURSE PRACTITIONER

## 2025-02-22 NOTE — LETTER
PHYSICIAN’S AND CHIROPRACTIC PHYSICIAN'S   PROGRESS REPORT   CERTIFICATION OF DISABILITY Claim Number:     Social Security Number:    Patient’s Name: Williams Armstrong Date of Injury: 2/19/2025   Employer:  [employer]  Name of MCO (if applicable):      Patient’s Job Description/Occupation:        Previous Injuries/Diseases/Surgeries Contributing to the Condition:  None      Diagnosis: (S05.01XD) Injury of right conjunctiva and corneal abrasion, subsequent encounter      Related to the Industrial Injury? Yes     Explain: Patient returns for follow-up office visit, states he feels completely improved without any visual changes or sensitivities.  Would like to return to work without restrictions.      Objective Medical Findings: Right eye: PERRLA, FROM, no erythema      X   None - Discharged                         Stable  Yes                 Ratable  No     X   Generally Improved                         Condition Worsened                  Condition Same  May Have Suffered a Permanent Disability No     Treatment Plan:    Released to full duty without restrictions         No Change in Therapy                  PT/OT Prescribed                      Medication May be Used While Working        Case Management                          PT/OT Discontinued    Consultation    Further Diagnostic Studies:    Prescription(s)               X  Released to FULL DUTY /No Restrictions on (Date):       Certified TOTALLY TEMPORARILY DISABLED (Indicate Dates) From:   To:      Released to RESTRICTED/Modified Duty on (Date): From:   To:    Restrictions Are:         No Sitting    No Standing    No Pulling Other:         No Bending at Waist     No Stooping     No Lifting        No Carrying     No Walking Lifting Restricted to (lbs.):          No Pushing        No Climbing     No Reaching Above Shoulders       Date of Next Visit:    Date of this Exam: 2/22/2025 Physician/Chiropractic Physician Name: PHU Lindo  Physician/Chiropractic Physician Signature:  Alejo Johnson DO MPH     [place]:  [address] [place] [address], [address] [place], [place] [address] - Telephone [phone] [place]:  [address] [place] [address], [address] [place], [place] [address] - Telephone [phone]    [email or web address]  D-39 (Rev. 10/24)

## 2025-02-22 NOTE — PROGRESS NOTES
"Subjective:   Williams Armstrong is a 24 y.o. male who presents for Follow-Up (Workers comp eye injury was seen at [facility]. His eye is feeling better)       HPI  Patient is a pleasant 24 y.o. who presents for evaluation of Worker's Comp. injury.  Please see scanned documents.  Reports for improvement noted to return to work without restrictions.    ROS  All other systems are negative except as documented above within HPI.    MEDS:   Current Outpatient Medications:     erythromycin 5 MG/GM Ointment, Pt to apply 1/4 inch ribbon to lower lid of affected eye 3 times daily x 5 days., Disp: 3.5 g, Rfl: 0  ALLERGIES:   Allergies   Allergen Reactions    Penicillins Rash and Swelling     Rash & Swelling         Patient's PMH, SocHx, SurgHx, FamHx, Drug allergies and medications were reviewed.     Objective:   /80 (BP Location: Right arm, Patient Position: Sitting, BP Cuff Size: Adult)   Pulse 81   Temp 36.9 °C (98.4 °F) (Temporal)   Resp 16   Ht 1.803 m (5' 11\")   Wt 115 kg (253 lb 8 oz)   SpO2 98%   BMI 35.36 kg/m²     Physical Exam  Vitals and nursing note reviewed.   Constitutional:       General: He is awake.      Appearance: Normal appearance. He is well-developed.   HENT:      Head: Normocephalic and atraumatic.      Right Ear: External ear normal.      Left Ear: External ear normal.      Nose: Nose normal.      Mouth/Throat:      Lips: Pink.      Mouth: Mucous membranes are moist.      Pharynx: Oropharynx is clear.   Eyes:      General: Lids are normal.      Extraocular Movements: Extraocular movements intact.      Conjunctiva/sclera: Conjunctivae normal.      Comments: No acute abnormality noted.   Cardiovascular:      Rate and Rhythm: Normal rate and regular rhythm.   Pulmonary:      Effort: Pulmonary effort is normal.   Musculoskeletal:         General: Normal range of motion.      Cervical back: Normal range of motion.   Skin:     General: Skin is warm and dry.   Neurological:      Mental " Status: He is alert and oriented to person, place, and time.   Psychiatric:         Mood and Affect: Mood normal.         Behavior: Behavior normal. Behavior is cooperative.         Thought Content: Thought content normal.         Judgment: Judgment normal.         Assessment/Plan:   Assessment    1. Injury of right conjunctiva and corneal abrasion, subsequent encounter      Vital signs stable at today's acute urgent care visit.    See D39.  Patient return to work without restrictions.        Please note that this dictation was created using voice recognition software. I have made a reasonable attempt to correct obvious errors, but I expect that there are errors of grammar and possibly content that I did not discover before finalizing the note.

## 2025-04-18 NOTE — PROGRESS NOTES
Transport here to take patient to Rehab with parents. Report given to Harsh BACON.    11.4   12 )-----------( 295      ( 2025 12:00 )             35.5   04-18    134[L]  |  101  |  11  ----------------------------<  79  3.9   |  23  |  0.41[L]    Ca    9.1      2025 12:00    TPro  7.2  /  Alb  3.3  /  TBili  <0.2  /  DBili  x   /  AST  17  /  ALT  20  /  AlkPhos  71  04-18    Urinalysis Basic - ( 2025 12:00 )    Color: Yellow / Appearance: Clear / S.024 / pH: x  Gluc: 79 mg/dL / Ketone: Negative mg/dL  / Bili: Negative / Urobili: 0.2 mg/dL   Blood: x / Protein: Negative mg/dL / Nitrite: Negative   Leuk Esterase: Negative / RBC: x / WBC x   Sq Epi: x / Non Sq Epi: x / Bacteria: x    PCR 0.1

## 2025-06-13 ENCOUNTER — OFFICE VISIT (OUTPATIENT)
Dept: URGENT CARE | Facility: PHYSICIAN GROUP | Age: 25
End: 2025-06-13

## 2025-06-13 VITALS
WEIGHT: 261 LBS | SYSTOLIC BLOOD PRESSURE: 126 MMHG | HEART RATE: 88 BPM | RESPIRATION RATE: 14 BRPM | BODY MASS INDEX: 37.37 KG/M2 | DIASTOLIC BLOOD PRESSURE: 76 MMHG | OXYGEN SATURATION: 97 % | TEMPERATURE: 98.4 F | HEIGHT: 70 IN

## 2025-06-13 DIAGNOSIS — T67.5XXA HEAT EXHAUSTION, INITIAL ENCOUNTER: ICD-10-CM

## 2025-06-13 DIAGNOSIS — T30.0 FIRST DEGREE BURN: ICD-10-CM

## 2025-06-13 DIAGNOSIS — L55.9 BURN FROM THE SUN: Primary | ICD-10-CM

## 2025-06-13 PROCEDURE — 3074F SYST BP LT 130 MM HG: CPT

## 2025-06-13 PROCEDURE — 3078F DIAST BP <80 MM HG: CPT

## 2025-06-13 PROCEDURE — 99214 OFFICE O/P EST MOD 30 MIN: CPT

## 2025-06-13 RX ORDER — DIPHENHYDRAMINE HCL 12.5 MG/5ML
6.25 SOLUTION ORAL ONCE
Status: DISCONTINUED | OUTPATIENT
Start: 2025-06-13 | End: 2025-06-13

## 2025-06-13 RX ORDER — DIPHENHYDRAMINE HCL 25 MG
25 CAPSULE ORAL ONCE
Status: COMPLETED | OUTPATIENT
Start: 2025-06-13 | End: 2025-06-13

## 2025-06-13 RX ORDER — HYDROXYZINE HYDROCHLORIDE 50 MG/ML
50 INJECTION, SOLUTION INTRAMUSCULAR ONCE
Status: DISCONTINUED | OUTPATIENT
Start: 2025-06-13 | End: 2025-06-13

## 2025-06-13 RX ADMIN — Medication 25 MG: at 10:12

## 2025-06-13 ASSESSMENT — ENCOUNTER SYMPTOMS
EYE DISCHARGE: 0
DIARRHEA: 0
FEVER: 0
WHEEZING: 0
COUGH: 0
EYE REDNESS: 0
CONSTIPATION: 0
BLOOD IN STOOL: 0
BRUISES/BLEEDS EASILY: 0
VOMITING: 0

## 2025-06-13 NOTE — PROGRESS NOTES
Subjective:     Williams Armstrong is a 24 y.o. male who presents for Rash (Applied Sunscreen today, redness,painful, blisters, all over body )      HPI    Review of Systems   Constitutional:  Negative for fever.   HENT:  Negative for congestion and ear discharge.    Eyes:  Negative for discharge and redness.   Respiratory:  Negative for cough and wheezing.    Gastrointestinal:  Negative for blood in stool, constipation, diarrhea and vomiting.   Genitourinary:  Negative for frequency and hematuria.   Skin:  Positive for itching and rash.   Endo/Heme/Allergies:  Does not bruise/bleed easily.        CURRENT MEDICATIONS:  erythromycin Oint    Allergies:   Allergies[1]    Current Problems: Williams Armstrong does not have any pertinent problems on file.  Past Surgical Hx:    Past Surgical History:   Procedure Laterality Date    HARDWARE REMOVAL ORTHO Right 1/3/2020    Procedure: HARDWARE REMOVAL ORTHO- PELVIS;  Surgeon: Miguel A Solares M.D.;  Location: [facility];  Service: Orthopedics    ORIF, FRACTURE, FEMUR Right 1/3/2020    Procedure: ORIF, FRACTURE, FEMUR;  Surgeon: Miguel A Solares M.D.;  Location: [facility];  Service: Orthopedics    HARDWARE REMOVAL ORTHO Right 10/11/2019    Procedure: HARDWARE REMOVAL ORTHO - FEMUR SCREW DYNAMIZATION;  Surgeon: Miguel A Solares M.D.;  Location: [facility];  Service: Orthopedics    ORIF, PELVIS Right 7/24/2019    Procedure: ORIF, PELVIS- SIDE TO BE DETERMINED ;  Surgeon: Miguel A Solares M.D.;  Location: [facility];  Service: Orthopedics    FEMUR NAILING INTRAMEDULLARY Right 7/20/2019    Procedure: INSERTION, INTRAMEDULLARY KRIS, FEMUR;  Surgeon: Delio Lee M.D.;  Location: [facility];  Service: Orthopedics    IRRIGATION & DEBRIDEMENT ORTHO Right 7/20/2019    Procedure: IRRIGATION AND DEBRIDEMENT, WOUND;  Surgeon: Delio Lee M.D.;  Location: [facility];  Service: Orthopedics     "LACERATION REPAIR N/A 7/20/2019    Procedure: REPAIR, LACERATION- SCALP;  Surgeon: Delio Lee M.D.;  Location: [facility];  Service: Orthopedics      Past Social Hx:  reports that he has been smoking cigars and cigarettes. He has never used smokeless tobacco. He reports current alcohol use. He reports that he does not use drugs.   Past Family Hx:  Williams Armstrong family history is not on file.     (Allergies, Medications, & Tobacco/Substance Use were reconciled by the Medical Assistant and reviewed by myself. The family history is prepopulated)       Objective:     /76 (BP Location: Right arm, Patient Position: Sitting, BP Cuff Size: Adult long)   Pulse 88   Temp 36.9 °C (98.4 °F) (Temporal)   Resp 14   Ht 1.778 m (5' 10\")   Wt 118 kg (261 lb)   SpO2 97%   BMI 37.45 kg/m²     Physical Exam  Constitutional:       General: He is not in acute distress.     Appearance: Normal appearance. He is not ill-appearing or toxic-appearing.   HENT:      Head: Normocephalic and atraumatic.      Nose: No congestion or rhinorrhea.   Eyes:      General: No scleral icterus.        Right eye: No discharge.         Left eye: No discharge.   Cardiovascular:      Rate and Rhythm: Normal rate and regular rhythm.      Heart sounds: Normal heart sounds. No murmur heard.     No friction rub. No gallop.   Pulmonary:      Effort: Pulmonary effort is normal. No respiratory distress.      Breath sounds: Normal breath sounds.   Abdominal:      General: Abdomen is flat.      Palpations: Abdomen is soft.   Musculoskeletal:         General: Normal range of motion.      Cervical back: Normal range of motion.   Skin:            Comments: Diffuse macular, blanching, painful to touch,  no hives   Neurological:      General: No focal deficit present.      Mental Status: He is alert and oriented to person, place, and time. Mental status is at baseline.   Psychiatric:         Behavior: Behavior normal.         Judgment: " Judgment normal.         Assessment/Plan:     Williams was seen today for rash.    Diagnoses and all orders for this visit:    Burn from the sun  -     diphenhydrAMINE (Benadryl) capsule 25 mg    Heat exhaustion, initial encounter  -     Discontinue: hydrOXYzine (Vistaril) injection 50 mg  -     Discontinue: diphenhydrAMINE (Benadryl) 12.5 MG/5ML liquid 6.25 mg  -     diphenhydrAMINE (Benadryl) capsule 25 mg    First degree burn  -     Discontinue: hydrOXYzine (Vistaril) injection 50 mg  -     Discontinue: diphenhydrAMINE (Benadryl) 12.5 MG/5ML liquid 6.25 mg  -     diphenhydrAMINE (Benadryl) capsule 25 mg    Based on history of presenting illness, review of systems and physical exam findings, most likely etiology of rash is first degree sunburn and associated heat exhaustion.  discussed symptomatic management with pharmacotherapy and over-the-counter medications.  On my exam I do not see any signs or symptoms consistent with a bacterial infection currently requiring oral antibiotics.  Discussed the risks the benefits and the indications of all new medications prescribed today.  Strict return and ED precautions were discussed and all questions were answered.     Discussed this does not appear to be allergy;    Sunburn is damage to the skin that is caused by being in the sun too much.  Do not put ice on your sunburn. Try taking a cool bath or putting a cool, wet cloth (cool compress) on your skin. This may help with pain.  Do not break any blisters if you have them.  Put on sunscreen 15-30 minutes before you go out in the sun. This can help you to not get sunburned.     Differential diagnosis, natural history, supportive care, and indications for immediate follow-up discussed.    Advised the patient to follow-up with the primary care physician for recheck, reevaluation, and consideration of further management.    Please note that this dictation was created using voice recognition software. I have made reasonable attempt  to correct obvious errors, but I expect that there are errors of grammar and possibly content that I did not discover before finalizing the note.    This note was electronically signed by Charlotte Sanchez MD PhD         [1]   Allergies  Allergen Reactions    Penicillins Rash and Swelling     Rash & Swelling

## 2025-06-13 NOTE — LETTER
June 13, 2025    To Whom It May Concern:         This is confirmation that Williams Armstrong attended his scheduled appointment with Charlotte Sanchez MD, PhD on 6/13/25. He is to be excused from work on 6/13/25.          If you have any questions please do not hesitate to call me at the phone number listed below.    Sincerely,          Charlotte Sanchez MD, PhD  [phone]

## (undated) DEVICE — SENSOR SPO2 NEO LNCS ADHESIVE (20/BX) SEE USER NOTES

## (undated) DEVICE — GLOVE BIOGEL SZ 7.5 SURGICAL PF LTX - (50PR/BX 4BX/CA)

## (undated) DEVICE — PACK MAJOR ORTHO - (2EA/CA)

## (undated) DEVICE — PACK LOWER EXTREMITY - (2/CA)

## (undated) DEVICE — CHLORAPREP 26 ML APPLICATOR - ORANGE TINT(25/CA)

## (undated) DEVICE — KIT ROOM DECONTAMINATION

## (undated) DEVICE — GOWN WARMING STANDARD FLEX - (30/CA)

## (undated) DEVICE — SET LEADWIRE 5 LEAD BEDSIDE DISPOSABLE ECG (1SET OF 5/EA)

## (undated) DEVICE — CANISTER SUCTION 3000ML MECHANICAL FILTER AUTO SHUTOFF MEDI-VAC NONSTERILE LF DISP  (40EA/CA)

## (undated) DEVICE — GLOVE BIOGEL SZ 7 SURGICAL PF LTX - (50PR/BX 4BX/CA)

## (undated) DEVICE — MASK ANESTHESIA ADULT  - (100/CA)

## (undated) DEVICE — GOWN SURGEONS X-LARGE - DISP. (30/CA)

## (undated) DEVICE — TUBING CLEARLINK DUO-VENT - C-FLO (48EA/CA)

## (undated) DEVICE — DRAPE U SPLIT IMP 54 X 76 - (24/CA)

## (undated) DEVICE — PADDING CAST 6 IN STERILE - 6 X 4 YDS (24/CA)

## (undated) DEVICE — GLOVE SZ 8 BIOGEL PI MICRO - PF LF (50PR/BX)

## (undated) DEVICE — ELECTRODE 850 FOAM ADHESIVE - HYDROGEL RADIOTRNSPRNT (50/PK)

## (undated) DEVICE — SUCTION INSTRUMENT YANKAUER BULBOUS TIP W/O VENT (50EA/CA)

## (undated) DEVICE — KIT ANESTHESIA W/CIRCUIT & 3/LT BAG W/FILTER (20EA/CA)

## (undated) DEVICE — SUTURE 0 VICRYL PLUS CT-1 - 8 X 18 INCH (12/BX)

## (undated) DEVICE — SODIUM CHL IRRIGATION 0.9% 1000ML (12EA/CA)

## (undated) DEVICE — HEAD HOLDER JUNIOR/ADULT

## (undated) DEVICE — GLOVE BIOGEL INDICATOR SZ 8 SURGICAL PF LTX - (50/BX 4BX/CA)

## (undated) DEVICE — ROD GUIDE R-T TRIGEN 3 X 1000 (1EA)

## (undated) DEVICE — SUTURE 1 VICRYL PLUS CTX - 8 X 18 INCH (12/BX)

## (undated) DEVICE — ROD GUIDE BALL TIP 3.0MM X 1000MM

## (undated) DEVICE — DRAPE IOBAN II INCISE 23X17 - (10EA/BX 4BX/CA)

## (undated) DEVICE — PROTECTOR ULNA NERVE - (36PR/CA)

## (undated) DEVICE — BLADE SURGICAL CLIPPER - (50EA/CA)

## (undated) DEVICE — BIT DRILL SHORT 4.2MM X 155MM (4TX2=8)

## (undated) DEVICE — GLOVE BIOGEL PI INDICATOR SZ 6.5 SURGICAL PF LF - (50/BX 4BX/CA)

## (undated) DEVICE — DRAPE C ARMOR (12EA/CA)

## (undated) DEVICE — WATER IRRIG. STER. 1500 ML - (9/CA)

## (undated) DEVICE — DRAPE C-ARM LARGE 41IN X 74 IN - (10/BX 2BX/CA)

## (undated) DEVICE — LACTATED RINGERS INJ 1000 ML - (14EA/CA 60CA/PF)

## (undated) DEVICE — PAD LAP STERILE 18 X 18 - (5/PK 40PK/CA)

## (undated) DEVICE — SET EXTENSION WITH 2 PORTS (48EA/CA) ***PART #2C8610 IS A SUBSTITUTE*****

## (undated) DEVICE — KIT EVACUATER 3 SPRING PVC LF 1/8 DRAIN SIZE (10EA/CA)"

## (undated) DEVICE — DRAPE SURGICAL U 77X120 - (10/CA)

## (undated) DEVICE — BONE WAX (12PK/BX)

## (undated) DEVICE — SUTURE 2-0 MONOCRYL CT-1

## (undated) DEVICE — SUTURE 3-0 ETHILON FS-1 - (36/BX) 30 INCH

## (undated) DEVICE — BANDAGE ELASTIC 6 HONEYCOMB - 6X5YD LF (20/CA)"

## (undated) DEVICE — SLEEVE, VASO, THIGH, MED

## (undated) DEVICE — IMMOBILIZER KNEE 20 INCH - (1/EA)

## (undated) DEVICE — DRAPE LARGE 3 QUARTER - (20/CA)

## (undated) DEVICE — SUTURE 1 VICRYL PLUS CT-1 - 18 INCH (12/BX)

## (undated) DEVICE — SUTURE GENERAL

## (undated) DEVICE — GLOVE BIOGEL PI ORTHO SZ 8.5 PF LF (40/BX)

## (undated) DEVICE — GLOVE BIOGEL ECLIPSE PF LATEX SIZE 7.5

## (undated) DEVICE — WIRE GUIDE R-T TRIGEN 3.2MM (1EA)

## (undated) DEVICE — GLOVE BIOGEL SZ 6.5 SURGICAL PF LTX (50PR/BX 4BX/CA)

## (undated) DEVICE — SUTURE 2-0 VICRYL PLUS CT-1 - 8 X 18 INCH(12/BX)

## (undated) DEVICE — GLOVE BIOGEL PI INDICATOR SZ 8.0 SURGICAL PF LF -(50/BX 4BX/CA)

## (undated) DEVICE — NEPTUNE 4 PORT MANIFOLD - (20/PK)

## (undated) DEVICE — GLOVE BIOGEL PI INDICATOR SZ 7.0 SURGICAL PF LF - (50/BX 4BX/CA)

## (undated) DEVICE — BIT DRILL INTERTAN 4.0 SHORT

## (undated) DEVICE — ELECTRODE DUAL RETURN W/ CORD - (50/PK)

## (undated) DEVICE — SUTURE 1 VICRYL PLUS CTX - 36 INCH (36/BX)

## (undated) DEVICE — DRESSING PETROLEUM GAUZE 5 X 9" (50EA/BX 4BX/CA)"

## (undated) DEVICE — GUIDE PIN CALIBRATED (5EA/PK) (4TX6=24)

## (undated) DEVICE — GLOVE BIOGEL ECLIPSE PF LATEX SIZE 9.0

## (undated) DEVICE — DRAPE 36X28IN RAD CARM BND BG - (25/CA) O

## (undated) DEVICE — BIT DRILL INTERTAN 4.0 LONG

## (undated) DEVICE — DRAPE U ORTHOPEDIC - (10/BX)

## (undated) DEVICE — BIT DRILL LONG CALIBRATED 4.2MM X 330MM (4TX2=8)

## (undated) DEVICE — GLOVE BIOGEL SZ 8 SURGICAL PF LTX - (50PR/BX 4BX/CA)

## (undated) DEVICE — DRAPE STRLE REG TOWEL 18X24 - (10/BX 4BX/CA)"

## (undated) DEVICE — BANDAGE ROLL STERILE BULKEE 4.5 IN X 4 YD (100EA/CA)

## (undated) DEVICE — TRAY SKIN SCRUB PVP WET (20EA/CA) PART #DYND70356 DISCONTINUED

## (undated) DEVICE — TOWELS CLOTH SURGICAL - (4/PK 20PK/CA)

## (undated) DEVICE — GLOVE BIOGEL ECLIPSE  PF LATEX SIZE 6.5 (50PR/BX)

## (undated) DEVICE — TUBE E-T HI-LO CUFF 7.0MM (10EA/PK)

## (undated) DEVICE — GLOVE BIOGEL INDICATOR SZ 7.5 SURGICAL PF LTX - (50PR/BX 4BX/CA)

## (undated) DEVICE — BANDAGE ELASTIC STERILE MATRIX 6 X 10 (20EA/CA)

## (undated) DEVICE — BLOCK

## (undated) DEVICE — SPONGE GAUZESTER 4 X 4 4PLY - (128PK/CA)